# Patient Record
Sex: FEMALE | Race: BLACK OR AFRICAN AMERICAN | NOT HISPANIC OR LATINO | URBAN - METROPOLITAN AREA
[De-identification: names, ages, dates, MRNs, and addresses within clinical notes are randomized per-mention and may not be internally consistent; named-entity substitution may affect disease eponyms.]

---

## 2017-03-06 ENCOUNTER — TELEPHONE (OUTPATIENT)
Dept: OBSTETRICS AND GYNECOLOGY | Facility: CLINIC | Age: 32
End: 2017-03-06

## 2017-03-06 NOTE — TELEPHONE ENCOUNTER
----- Message from Sabi Crouch sent at 3/6/2017  8:34 AM EST -----  Regarding: pelvic pressure  Contact: [phone]  Dr rajput pt with a Mirena has had a pressure in the vagina and rectum since yesterday, she had BM on Saturday but nothing since.  With  Urination she is able to go but having some pelvic pain when she does go.      She has ask when she is due to have Mirena removed but told her I would have to have someone retreive her paper chart for old office.  She thinks it is due to be removed/changed this year.

## 2017-03-06 NOTE — TELEPHONE ENCOUNTER
It is unlikely that her pain is from an IUD that's been over 4 years, anything is possible.  If she is constipated out suggest MiraLAX daily and if there is any dysuria she should come in and get urinalysis for starters

## 2017-12-15 ENCOUNTER — OFFICE VISIT (OUTPATIENT)
Dept: OBSTETRICS AND GYNECOLOGY | Facility: CLINIC | Age: 32
End: 2017-12-15

## 2017-12-15 VITALS
BODY MASS INDEX: 39.65 KG/M2 | WEIGHT: 238 LBS | HEIGHT: 65 IN | DIASTOLIC BLOOD PRESSURE: 80 MMHG | SYSTOLIC BLOOD PRESSURE: 130 MMHG

## 2017-12-15 DIAGNOSIS — Z01.419 WOMEN'S ANNUAL ROUTINE GYNECOLOGICAL EXAMINATION: Primary | ICD-10-CM

## 2017-12-15 DIAGNOSIS — Z30.433 ENCOUNTER FOR REMOVAL AND REINSERTION OF INTRAUTERINE CONTRACEPTIVE DEVICE (IUD): ICD-10-CM

## 2017-12-15 PROCEDURE — 58301 REMOVE INTRAUTERINE DEVICE: CPT | Performed by: OBSTETRICS & GYNECOLOGY

## 2017-12-15 PROCEDURE — 58300 INSERT INTRAUTERINE DEVICE: CPT | Performed by: OBSTETRICS & GYNECOLOGY

## 2017-12-15 PROCEDURE — 99385 PREV VISIT NEW AGE 18-39: CPT | Performed by: OBSTETRICS & GYNECOLOGY

## 2017-12-15 RX ORDER — VALACYCLOVIR HYDROCHLORIDE 500 MG/1
TABLET, FILM COATED ORAL
COMMUNITY
Start: 2017-12-07 | End: 2022-07-26

## 2017-12-18 PROBLEM — Z97.5 IUD (INTRAUTERINE DEVICE) IN PLACE: Status: ACTIVE | Noted: 2017-12-18

## 2017-12-18 NOTE — PROGRESS NOTES
PROCEDURE NOTES    I identified the patient and obtained her informed consent for the procedure.    PROCEDURE  REMOVAL OF IUD    We discussed options for contraception and Kay Smith choose to have her IUD removed.    Speculum was placed and cervix identified.  The IUD string was located.  With a Ring Forcep the Mirena  IUD  was removed, she tolerated it well.   The procedure was completed without any complications.    PROCEDURE    IUD INSERTION    This patient was counseled on the benefits and risks of insertion.  She has chosen  the Mirena.    No LMP recorded. Patient has had an implant.   UCG  not done    After re-gloving with sterile gloves, the uterus sounded to 8 cm   The The Mirena was advanced to a point 2 cms from the fundus and then the arms were released from the sheath.  The device was advanced to the fundus and the device was released fully from the sheath.  .  The string was trimmed to 3-3.5 cm.    The procedure was well tolerated. Kay Smith was given 's pamphlet and instructions on pelvic rest for 72 hours; OTC tylenol/ NSAIDS and heating pad prn.   She will call for fever, pain , abnormal discharge or heavy bleeding.   Follow up in 6 weeks or sooner as needed.    Tate Conley MD

## 2018-02-23 ENCOUNTER — OFFICE VISIT (OUTPATIENT)
Dept: OBSTETRICS AND GYNECOLOGY | Facility: CLINIC | Age: 33
End: 2018-02-23

## 2018-02-23 VITALS
SYSTOLIC BLOOD PRESSURE: 140 MMHG | DIASTOLIC BLOOD PRESSURE: 90 MMHG | BODY MASS INDEX: 39.69 KG/M2 | RESPIRATION RATE: 16 BRPM | WEIGHT: 240 LBS

## 2018-02-23 DIAGNOSIS — Z30.431 IUD CHECK UP: Primary | ICD-10-CM

## 2018-02-23 PROCEDURE — 99212 OFFICE O/P EST SF 10 MIN: CPT | Performed by: OBSTETRICS & GYNECOLOGY

## 2018-03-29 ENCOUNTER — TELEPHONE (OUTPATIENT)
Dept: OBSTETRICS AND GYNECOLOGY | Facility: CLINIC | Age: 33
End: 2018-03-29

## 2018-03-29 RX ORDER — FLUCONAZOLE 150 MG/1
150 TABLET ORAL DAILY
Qty: 1 TABLET | Refills: 0 | Status: SHIPPED | OUTPATIENT
Start: 2018-03-29 | End: 2020-02-18

## 2018-03-29 NOTE — TELEPHONE ENCOUNTER
Yael pt-called stating she is on an antibiotic for a sinus infection and now has a yeast infection and requesting a RX to be sent to [facility] in [place]. Please advise pt

## 2018-09-17 ENCOUNTER — TELEPHONE (OUTPATIENT)
Dept: OBSTETRICS AND GYNECOLOGY | Facility: CLINIC | Age: 33
End: 2018-09-17

## 2018-09-17 RX ORDER — FLUCONAZOLE 150 MG/1
150 TABLET ORAL DAILY
Qty: 1 TABLET | Refills: 0 | Status: SHIPPED | OUTPATIENT
Start: 2018-09-17 | End: 2020-02-18

## 2018-09-17 NOTE — TELEPHONE ENCOUNTER
Dr. Conley Pt    Pt is having Sx of white vaginal discharge, slight itching , no odor. She thinks it is a yeast infection.  She would like a Rx sent to [facility].    Callback [phone]    [place], [place]

## 2020-02-18 ENCOUNTER — OFFICE VISIT (OUTPATIENT)
Dept: OBSTETRICS AND GYNECOLOGY | Facility: CLINIC | Age: 35
End: 2020-02-18

## 2020-02-18 VITALS
HEIGHT: 66 IN | DIASTOLIC BLOOD PRESSURE: 70 MMHG | SYSTOLIC BLOOD PRESSURE: 118 MMHG | BODY MASS INDEX: 33.75 KG/M2 | WEIGHT: 210 LBS

## 2020-02-18 DIAGNOSIS — Z97.5 IUD (INTRAUTERINE DEVICE) IN PLACE: ICD-10-CM

## 2020-02-18 DIAGNOSIS — Z01.419 ENCOUNTER FOR WELL WOMAN EXAM WITH ROUTINE GYNECOLOGICAL EXAM: Primary | ICD-10-CM

## 2020-02-18 PROCEDURE — 99395 PREV VISIT EST AGE 18-39: CPT | Performed by: OBSTETRICS & GYNECOLOGY

## 2020-02-18 NOTE — PROGRESS NOTES
Subjective   Chief Complaint   Patient presents with   • Annual Exam     Kay Smith is a 35 y.o. year old  presenting to be seen for her annual exam.    Current birth control method: IUD - Mirena.    Patient's last menstrual period was 2020.    She is sexually active.   Condoms are not typically used.    [place] is painful or she is having problems :no  She has concerns about domestic violence: no.    Cycle Frequency: Irregular not sure if every month   Menstrual cycle character: flow is typically normal   Cycle Duration: 7 - 10   Number of heavy days of flows: 1   Intermenstrual bleeding present: no   Post-coital bleeding present: no     She exercises regularly: no. Lost weight 30#  Past 2 yrs  -  Rx to start ; was down to 195 # on phenteramine  Self breast awareness:no    Calcium intake: is not adequate.1  Caffeine intake: no or mild caffeine use  Social History    Tobacco Use      Smoking status: Never Smoker      Smokeless tobacco: Never Used    Social History     Substance and Sexual Activity   Alcohol Use No        The following portions of the patient's history were reviewed and updated as is  appropriate:problem list, current medications, allergies, past family history, past medical history, past social history and past surgical history.  Family history was updated as was obstetrical history    Current Outpatient Medications:   •  levonorgestrel (MIRENA, 52 MG,) 20 MCG/24HR IUD, 1 each by Intrauterine route 1 (One) Time., Disp: , Rfl:   •  valACYclovir (VALTREX) 500 MG tablet, , Disp: , Rfl:     Review of systems  Constitutional    POS weight loss                            NEG anorexia, fevers, malaise or night sweats  Breast                POS nothing reported                            NEG persistent breast lump, skin dimpling or nipple discharge  GI                      POS nothing reported                            NEG bloating, change in bowel habits, melena or reflux symptoms       "                 POS nothing reported                            NEG dysuria, frequency or hematuria       Objective   /70   Ht 167 cm (65.75\")   Wt 95.3 kg (210 lb)   LMP 01/26/2020 Comment: mirena  Breastfeeding No   BMI 34.15 kg/m²       General:  well developed; well nourished  no acute distress  appears stated age   Skin:  No suspicious lesions seen   Thyroid:    Breasts:  Examined in supine position  Symmetric without masses or skin dimpling  Nipples normal without inversion, lesions or discharge  Fibrocystic changes are present both breasts without a discrete mass   Abdomen: soft, non-tender; no masses  no umbilical or inguinal hernias are present  no hepato-splenomegaly   Pelvis: Clinical staff was present for exam  External genitalia:  normal appearance of the external genitalia including Bartholin's and [place]'s glands.  :  urethral meatus normal;  Vaginal:  normal pink mucosa without prolapse or lesions.  Cervix:  normal appearance. friable; Pap obtained IUD string present - 2.5 cms in length;  Uterus:  normal size, shape and consistency.  Adnexa:  normal bimanual exam of the adnexa.  Rectal:  digital rectal exam not performed; anus visually normal appearing.       Lab Review   Pap test    Imaging  No data reviewed       Assessment     1. Normal GYN examination with IUD in place.  Slightly irregular cycles probably consistent with anovulation I will get her to chart her cycles.  2. IUD appears to be in proper position.  Nontender on examination no pain with intercourse some spotting from cervix  3. Weight loss associated with phentermine slight bump up off of phentermine but she reports it is been stable for couple of months.             Plan     1. Annual examination or sooner as needed; follow-up Pap cotesting   2. 1000 mg calcium in divided doses ideally in diet; regular exercise  3. Self breast awareness if > 30 years of age  4.    Chart cycles            No orders of the defined types " were placed in this encounter.    No orders of the defined types were placed in this encounter.          This note was electronically signed.    Tate Conley MD  2/18/2020

## 2021-06-08 ENCOUNTER — TRANSCRIBE ORDERS (OUTPATIENT)
Dept: DIABETES SERVICES | Facility: HOSPITAL | Age: 36
End: 2021-06-08

## 2021-06-08 DIAGNOSIS — E11.9 TYPE 2 DIABETES MELLITUS WITHOUT COMPLICATION, UNSPECIFIED WHETHER LONG TERM INSULIN USE (HCC): Primary | ICD-10-CM

## 2021-06-15 ENCOUNTER — DOCUMENTATION (OUTPATIENT)
Dept: DIABETES SERVICES | Facility: HOSPITAL | Age: 36
End: 2021-06-15

## 2021-06-16 ENCOUNTER — APPOINTMENT (OUTPATIENT)
Dept: DIABETES SERVICES | Facility: HOSPITAL | Age: 36
End: 2021-06-16

## 2021-06-16 ENCOUNTER — HOSPITAL ENCOUNTER (OUTPATIENT)
Dept: DIABETES SERVICES | Facility: HOSPITAL | Age: 36
Setting detail: RECURRING SERIES
Discharge: HOME | End: 2021-06-16

## 2021-06-16 NOTE — CONSULTS
DIABETES EDUCATION CONSULT, 120 minutes Part 2 of diabetes education. This medical referred consult was provided as a telephone call, tele-health or e-visit, as patient is unable to attend an in-office appointment due to the COVID-19 crisis. Consent for treatment was given verbally. Please see media tab for assessment and notes if you use EPIC. If you are not an EPIC user a copy of patient's assessment and notes will be sent per routine. Thank you.

## 2021-06-16 NOTE — CONSULTS
Patient was able to complete Part 1 of the comprehensive diabetes education class today. She attempted yesterday evening, but was unable to complete due to work schedule and audio difficulty. Questions were addressed and education complete. She is looking forward to completing part 2 tonight. Thank you for this referral.

## 2021-06-16 NOTE — PLAN OF CARE
Patient was scheduled for comprehensive diabetes education on 6/15/21 and 6/16/21. On 6/15 patient completed assessment prior to class by phone with educator. Patient provided RN contact information for any questions, concerns or zoom difficulties. Staff was unable to reach patient for registration. Patient logged into zoom class at 530pm. Patient was unable to fully connect to zoom class and was disconnected a few minutes later. Patient will be contacted by OP staff to be rescheduled for part 1. Patient was sent zoom link for part 2.

## 2021-07-07 ENCOUNTER — APPOINTMENT (OUTPATIENT)
Dept: DIABETES SERVICES | Facility: HOSPITAL | Age: 36
End: 2021-07-07

## 2021-07-12 ENCOUNTER — OFFICE VISIT (OUTPATIENT)
Dept: OBSTETRICS AND GYNECOLOGY | Facility: CLINIC | Age: 36
End: 2021-07-12

## 2021-07-12 VITALS
DIASTOLIC BLOOD PRESSURE: 80 MMHG | WEIGHT: 236 LBS | HEIGHT: 66 IN | BODY MASS INDEX: 37.93 KG/M2 | SYSTOLIC BLOOD PRESSURE: 118 MMHG

## 2021-07-12 DIAGNOSIS — Z01.411 ENCOUNTER FOR GYNECOLOGICAL EXAMINATION WITH ABNORMAL FINDING: Primary | ICD-10-CM

## 2021-07-12 DIAGNOSIS — N76.0 ACUTE VAGINITIS: ICD-10-CM

## 2021-07-12 DIAGNOSIS — N91.2 AMENORRHEA: ICD-10-CM

## 2021-07-12 PROCEDURE — 87210 SMEAR WET MOUNT SALINE/INK: CPT | Performed by: OBSTETRICS & GYNECOLOGY

## 2021-07-12 PROCEDURE — 99395 PREV VISIT EST AGE 18-39: CPT | Performed by: OBSTETRICS & GYNECOLOGY

## 2021-07-12 PROCEDURE — 83986 ASSAY PH BODY FLUID NOS: CPT | Performed by: OBSTETRICS & GYNECOLOGY

## 2021-07-12 RX ORDER — ONDANSETRON 4 MG/1
TABLET, FILM COATED ORAL
COMMUNITY
Start: 2021-06-26 | End: 2022-07-26

## 2021-07-12 RX ORDER — FLUTICASONE PROPIONATE 50 MCG
2 SPRAY, SUSPENSION (ML) NASAL DAILY
COMMUNITY
Start: 2021-06-28 | End: 2022-07-26

## 2021-07-12 RX ORDER — FLUCONAZOLE 200 MG/1
TABLET ORAL
COMMUNITY
Start: 2021-06-25 | End: 2022-07-26

## 2021-07-12 RX ORDER — MECLIZINE HYDROCHLORIDE 25 MG/1
25 TABLET ORAL 3 TIMES DAILY
COMMUNITY
Start: 2021-06-26 | End: 2022-07-26

## 2021-07-12 NOTE — PROGRESS NOTES
Subjective   Chief Complaint   Patient presents with   • Annual Exam   • Vaginal Discharge     on diflucan   • Vaginal Itching     Kay Smith is a 36 y.o. year old  presenting to be seen for her annual exam.    Current birth control method: IUD - Mirena.  Placed  good till   She took a antibiotic for an upper respiratory infection recently.  Has some sort of reaction since she temporarily went blinded went to the ER in [place] who put her back on the antibiotic.  Current she is taken Diflucan complaining of vaginal itching and discharge.  Not been sexually active in a while.    No LMP recorded. Patient has had an implant.    She is not sexually active in 2 years.  Condoms are not typically used.    STDs and sexual behavior discussed.  [place] is painful or she is having problems :not asked  She has concerns about domestic violence: no.    Cycle Frequency: absent                         She exercises regularly: no.  Discussed maintaining healthy weight and nutrition and injury avoidance  Self breast awareness:no    Calcium intake: is not adequate.1  Caffeine intake: no or mild caffeine use  Social History    Tobacco Use      Smoking status: Never Smoker      Smokeless tobacco: Never Used    Social History     Substance and Sexual Activity   Alcohol Use No      Discussed avoidance of illicit drugs    The following portions of the patient's history were reviewed and updated as is  appropriate:problem list, current medications, allergies, past family history, past medical history, past social history and past surgical history.    Current Outpatient Medications:   •  fluconazole (DIFLUCAN) 200 MG tablet, TAKE 1 TABLET BY MOUTH ONCE DAILY FOR 3 DAYS, Disp: , Rfl:   •  fluticasone (FLONASE) 50 MCG/ACT nasal spray, 2 sprays Daily., Disp: , Rfl:   •  levonorgestrel (MIRENA, 52 MG,) 20 MCG/24HR IUD, 1 each by Intrauterine route 1 (One) Time., Disp: , Rfl:   •  meclizine (ANTIVERT) 25 MG tablet, Take 25 mg by  "mouth 3 (Three) Times a Day. for 7 days, Disp: , Rfl:   •  ondansetron (ZOFRAN) 4 MG tablet, TAKE 1 TABLET BY MOUTH EVERY 8 HOURS FOR 3 DAYS, Disp: , Rfl:   •  valACYclovir (VALTREX) 500 MG tablet, , Disp: , Rfl:     Discussed risk factors for hypertension, hyperlipidemia coronary heart disease.    Review of systems    Constitutional    POS weight gain                            NEG anorexia, fatigue, fevers or night sweats  Breast                POS nothing reported                            NEG persistent breast lump, skin dimpling, breast tenderness or nipple discharge  GI                      POS nothing reported                            NEG bloating, change in bowel habits, melena or reflux symptoms                       POS nothing reported                            NEG dysuria, frequency or hematuria         Objective   /80   Ht 167.6 cm (66\")   Wt 107 kg (236 lb)   Breastfeeding No   BMI 38.09 kg/m²       General:  well developed; well nourished  no acute distress  appears stated age   Skin:  No suspicious lesions seen   Thyroid:    Breasts:  Examined in supine position  Symmetric without masses or skin dimpling  Nipples normal without inversion, lesions or discharge  Fibrocystic changes are present both breasts without a discrete mass   Abdomen: soft, non-tender; no masses  no umbilical or inguinal hernias are present  no hepato-splenomegaly   Pelvis: Clinical staff was present for exam  External genitalia:  normal appearance of the external genitalia including Bartholin's and [place]'s glands.  :  urethral meatus normal;  Vaginal:  normal pink mucosa without prolapse or lesions. discharge present -  yellow, white and thick; pH = 4.5 wet prep done: normal epithelial cells are present, clue cells are absent, pseudo-hyphae are absent and trichomonads are absent;  Cervix:  normal appearance. IUD string present - 2.5 cms in length;  Uterus:  normal size, shape and consistency.  Adnexa:  non " palpable bilaterally. Due to guarding  Rectal:  digital rectal exam not performed; anus visually normal appearing.       Lab Review   Pap test    Imaging  No data reviewed               Assessment     1. Normal GYN examination with amenorrhea associated with IUD  2. I think she had a yeast infection is clearing on the Diflucan.  I see no evidence of yeast microscopically.  3. Weight gain associated with Covid  4. Pap cotesting up-to-date             Plan     1. Annual examination or sooner as needed  2. 1000 mg calcium in divided doses ideally in diet; regular exercise  3. Self breast awareness discussed  4.                No orders of the defined types were placed in this encounter.    No orders of the defined types were placed in this encounter.          This note was electronically signed.    Tate Conley MD  7/12/2021

## 2021-10-27 ENCOUNTER — HOSPITAL ENCOUNTER (EMERGENCY)
Dept: HOSPITAL 22 - UTC | Age: 36
Discharge: HOME | End: 2021-10-27
Payer: MEDICAID

## 2021-10-27 VITALS
DIASTOLIC BLOOD PRESSURE: 99 MMHG | TEMPERATURE: 98.4 F | RESPIRATION RATE: 18 BRPM | OXYGEN SATURATION: 99 % | SYSTOLIC BLOOD PRESSURE: 149 MMHG | HEART RATE: 92 BPM

## 2021-10-27 VITALS
OXYGEN SATURATION: 99 % | TEMPERATURE: 98.42 F | HEART RATE: 92 BPM | RESPIRATION RATE: 18 BRPM | DIASTOLIC BLOOD PRESSURE: 99 MMHG | SYSTOLIC BLOOD PRESSURE: 149 MMHG

## 2021-10-27 VITALS — BODY MASS INDEX: 37.9 KG/M2

## 2021-10-27 DIAGNOSIS — J32.9: ICD-10-CM

## 2021-10-27 DIAGNOSIS — H66.001: Primary | ICD-10-CM

## 2021-10-27 PROCEDURE — G0463 HOSPITAL OUTPT CLINIC VISIT: HCPCS

## 2021-10-27 PROCEDURE — 99202 OFFICE O/P NEW SF 15 MIN: CPT

## 2022-07-26 ENCOUNTER — OFFICE VISIT (OUTPATIENT)
Dept: OBSTETRICS AND GYNECOLOGY | Facility: CLINIC | Age: 37
End: 2022-07-26

## 2022-07-26 VITALS
BODY MASS INDEX: 37.12 KG/M2 | RESPIRATION RATE: 16 BRPM | DIASTOLIC BLOOD PRESSURE: 70 MMHG | WEIGHT: 230 LBS | SYSTOLIC BLOOD PRESSURE: 116 MMHG

## 2022-07-26 DIAGNOSIS — Z30.431 IUD CHECK UP: ICD-10-CM

## 2022-07-26 DIAGNOSIS — Z01.419 ENCOUNTER FOR GYNECOLOGICAL EXAMINATION WITHOUT ABNORMAL FINDING: Primary | ICD-10-CM

## 2022-07-26 DIAGNOSIS — N60.11 FIBROCYSTIC BREAST CHANGES, BILATERAL: ICD-10-CM

## 2022-07-26 DIAGNOSIS — N60.12 FIBROCYSTIC BREAST CHANGES, BILATERAL: ICD-10-CM

## 2022-07-26 PROCEDURE — 99395 PREV VISIT EST AGE 18-39: CPT | Performed by: NURSE PRACTITIONER

## 2022-07-26 PROCEDURE — 2014F MENTAL STATUS ASSESS: CPT | Performed by: NURSE PRACTITIONER

## 2022-07-26 PROCEDURE — 3008F BODY MASS INDEX DOCD: CPT | Performed by: NURSE PRACTITIONER

## 2022-07-26 NOTE — PROGRESS NOTES
Annual Visit     Patient Name: Kay Smith  : 1985   MRN: [id number]   Care Team: Patient Care Team:  Bakari Osman MD as PCP - General (Internal Medicine)  Lashay Kay PA as Physician Assistant (Family Medicine)  Kemi Pino APRN as Nurse Practitioner (Nurse Practitioner)    Chief Complaint:    Chief Complaint   Patient presents with   • Annual Exam       HPI: Kay Smith is a 37 y.o. year old  presenting to be seen for her gynecologic exam.   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      Objective     Physical Exam    Neuro: alert and oriented to person, place and time   General:  alert; cooperative; well developed; well nourished   Skin:  No suspicious lesions seen   Thyroid: normal to inspection and palpation   Lungs:  breathing is unlabored  clear to auscultation bilaterally   Heart:  regular rate and rhythm, S1, S2 normal, no murmur, click, rub or gallop  normal apical impulse   Breasts:  Examined in supine position  Symmetric without masses or skin dimpling  Nipples normal without inversion, lesions or discharge  There are no palpable axillary nodes  Fibrocystic changes are present both breasts without a discrete mass   Abdomen: soft, non-tender; no masses  no umbilical or inguinal hernias are present  no hepato-splenomegaly   Pelvis: Clinical staff was present for exam  External genitalia:  normal appearance of the external genitalia including Bartholin's and [place]'s glands.  :  urethral meatus normal;  Vaginal:  normal pink mucosa without prolapse or lesions.  Cervix:  normal appearance. IUD string present - 2 cms in  length;  Uterus:  not palpable.  Adnexa:  non palpable bilaterally.  Rectal:  digital rectal exam not performed; anus visually normal appearing.     Bimanual exam limited d/t body habitus     Assessment / Plan      Assessment  Problems Addressed This Visit    ICD-10-CM ICD-9-CM   1. Encounter for gynecological examination without abnormal finding  Z01.419 V72.31   2. IUD check up  Z30.431 V25.42   3. Fibrocystic breast changes, bilateral  N60.11 610.1    N60.12        Plan    Pap smear not indicated   Discussed monthly SBEs and fibrocystic breast changes   Doing well with Mirena - discussed now indicated for 7 yrs - will be due for removal 12/2024   Reviewed warning signs to call for   AV 1 yr         19 to 39: Counseling/Anticipatory Guidance Discussed: family planning/contraception and breast cancer and self breast exams    Follow Up  Return in about 1 year (around 7/26/2023) for Annual physical.  Patient was given instructions and counseling regarding her condition or for health maintenance advice. Please see specific information pulled into the AVS if appropriate.     Kemi Pino, CELESTE  July 26, 2022  14:28 EDT

## 2023-04-04 ENCOUNTER — TELEPHONE (OUTPATIENT)
Dept: OBSTETRICS AND GYNECOLOGY | Facility: CLINIC | Age: 38
End: 2023-04-04
Payer: COMMERCIAL

## 2023-04-04 NOTE — TELEPHONE ENCOUNTER
PT STATES SHE IS HAVING SOME VAGINAL IRRITATION WITH DRYNESS. PT DENIES ANY BURNING OR ABNORMAL DISCHARGE WITH SLIGHT ITCHING, PT BELIEVES SHE MAY HAVE A POSSIBLE INFECTION AND IS WANTING TO BE SEEN, NEXT APPT IS 5/15/23 PLEASE ADVISE PT IF SHE CAN BE SEEN SOONER.

## 2023-04-05 ENCOUNTER — TELEPHONE (OUTPATIENT)
Dept: OBSTETRICS AND GYNECOLOGY | Facility: CLINIC | Age: 38
End: 2023-04-05

## 2023-04-05 NOTE — TELEPHONE ENCOUNTER
PROVIDER: EJ VELEZ    CALLER: DARSHANA CONTRERAS    PH#[phone]    SAME DAY CANCEL. SAYS MIXUP OF COMM ON DAYS SHE NEEDED

## 2023-04-11 ENCOUNTER — OFFICE VISIT (OUTPATIENT)
Dept: OBSTETRICS AND GYNECOLOGY | Facility: CLINIC | Age: 38
End: 2023-04-11
Payer: COMMERCIAL

## 2023-04-11 VITALS
BODY MASS INDEX: 34.7 KG/M2 | RESPIRATION RATE: 16 BRPM | WEIGHT: 215 LBS | SYSTOLIC BLOOD PRESSURE: 128 MMHG | DIASTOLIC BLOOD PRESSURE: 80 MMHG

## 2023-04-11 DIAGNOSIS — N76.0 VULVOVAGINITIS: Primary | ICD-10-CM

## 2023-04-11 DIAGNOSIS — Z30.431 IUD CHECK UP: ICD-10-CM

## 2023-04-11 NOTE — PROGRESS NOTES
Problem Visit     Patient Name: Kay Smith  : 1985   MRN: [id number]   Care Team: Patient Care Team:  Bakari Osman MD as PCP - General (Internal Medicine)  Lashay Kay PA as Physician Assistant (Family Medicine)  Kemi Pino APRN as Nurse Practitioner (Nurse Practitioner)    Chief Complaint:    Vulvar irritation     HPI: Kay Smith is a 38 y.o. year old  presenting to be seen with c/o vulvar irritation.   Irritation present x 1 month now   States when it first began she used a Honey Pot suppository that contains boric acid and that was helpful x 3-4 days, but then sx returned   No vaginal c/o   States vulvar irritation was severe last wk and she noticed fissures between the labial folds   Today irritation is mild - she has been using coconut oil when labia feels dry and that is helpful   Hasn't used any new products recently   Has been walking outside lately   Not sexually active in 3 yrs now   No pelvic pain     Mirena placed 2017   Amenorrhea with method   Spotting noted today - states she hasn't had any bldg with method typically     AV done 2022       Subjective      I have reviewed the patients family history, social history, past medical history, past surgical history and have updated it as appropriate.    /80   Resp 16   Wt 97.5 kg (215 lb)   BMI 34.70 kg/m²     BMI reviewed: Body mass index is 34.7 kg/m².      Objective     Physical Exam      Neuro: alert and oriented to person, place and time   General:  alert; cooperative; well developed; well nourished   Skin:  Not performed.   Thyroid: not examined   Lungs:  breathing is unlabored   Heart:  Not performed.   Breasts:  Not performed.   Abdomen: Not performed.   Pelvis: Clinical staff was present for exam  External genitalia:  normal appearance of the external genitalia including Bartholin's and [place]'s glands.  :  urethral meatus normal;  Vaginal:  normal pink mucosa without prolapse or lesions. blood  present -  small amount;  Cervix:  normal appearance. IUD string present - 2.5 cms in length;  Uterus:  normal size, shape and consistency.  Adnexa:  normal bimanual exam of the adnexa.  Rectal:  digital rectal exam not performed; anus visually normal appearing.         Assessment / Plan      Assessment  Problems Addressed This Visit    ICD-10-CM ICD-9-CM   1. Vulvovaginitis  N76.0 616.10   2. IUD check up  Z30.431 V25.42       Plan    Oneswab pending for BV, candida, and aerobic panels   Discussed d/t bldg on exam today, will need to wait for cx results - wet mount with numerous RBCs   If irritation returns and is very bothersome before cx returns, she will let me know   Discussed Replens as needed for vulvar/vaginal dryness   Reviewed expected bldg pattern with Mirena - hypomenorrhea is also expected   If pelvic pain or AUB develops, would require further evaluation - pt v/u   Will call with cx results and final POC             Follow Up  Return for Next scheduled follow up.  Patient was given instructions and counseling regarding her condition or for health maintenance advice. Please see specific information pulled into the AVS if appropriate.     Kemi Pino, APRN  April 11, 2023  16:09 EDT

## 2023-06-08 ENCOUNTER — TELEPHONE (OUTPATIENT)
Dept: OBSTETRICS AND GYNECOLOGY | Facility: CLINIC | Age: 38
End: 2023-06-08
Payer: COMMERCIAL

## 2023-06-08 NOTE — TELEPHONE ENCOUNTER
Patient was told Mirena was placed 12/15/2017 and with the new indications for birth control it is good for 8 years. 12/14/2025

## 2023-08-18 ENCOUNTER — OFFICE VISIT (OUTPATIENT)
Dept: OBSTETRICS AND GYNECOLOGY | Facility: CLINIC | Age: 38
End: 2023-08-18
Payer: COMMERCIAL

## 2023-08-18 VITALS
DIASTOLIC BLOOD PRESSURE: 84 MMHG | SYSTOLIC BLOOD PRESSURE: 120 MMHG | BODY MASS INDEX: 34.55 KG/M2 | WEIGHT: 215 LBS | HEIGHT: 66 IN

## 2023-08-18 DIAGNOSIS — Z30.49 ENCOUNTER FOR SURVEILLANCE OF OTHER CONTRACEPTIVE: ICD-10-CM

## 2023-08-18 DIAGNOSIS — Z01.419 ENCOUNTER FOR GYNECOLOGICAL EXAMINATION WITHOUT ABNORMAL FINDING: Primary | ICD-10-CM

## 2023-08-18 RX ORDER — LANCETS 33 GAUGE
EACH MISCELLANEOUS
COMMUNITY
Start: 2023-07-12

## 2023-08-18 RX ORDER — BLOOD-GLUCOSE METER
EACH MISCELLANEOUS SEE ADMIN INSTRUCTIONS
COMMUNITY
Start: 2023-06-27

## 2023-08-18 RX ORDER — HYDROXYZINE HYDROCHLORIDE 25 MG/1
1 TABLET, FILM COATED ORAL EVERY 12 HOURS SCHEDULED
COMMUNITY
Start: 2023-05-11

## 2023-08-18 RX ORDER — BLOOD SUGAR DIAGNOSTIC
STRIP MISCELLANEOUS
COMMUNITY
Start: 2023-06-27

## 2023-08-18 RX ORDER — CHOLECALCIFEROL (VITAMIN D3) 1250 MCG
1 CAPSULE ORAL WEEKLY
COMMUNITY
Start: 2023-07-29

## 2023-08-18 RX ORDER — SEMAGLUTIDE 1.34 MG/ML
1 INJECTION, SOLUTION SUBCUTANEOUS WEEKLY
COMMUNITY
Start: 2023-08-15

## 2023-08-21 LAB — REF LAB TEST METHOD: NORMAL

## 2023-08-23 NOTE — PROGRESS NOTES
"Chief Complaint  Kay Smith is a 38 y.o.  female presenting for Annual Exam    History of Present Illness  Kay is a very pleasant 39yo woman, , here for annual gyn exam.  She has had no gyn surgeries.  She uses a Mirena IUD for contraception.  She enjoys amenorrhea from that method.  She has no gyn c/o's today.    Pap smear will be updated today.  She does have a hx of abn pap.    The following portions of the patient's history were reviewed and updated as appropriate: allergies, current medications, past family history, past medical history, past social history, past surgical history, and problem list.    No Known Allergies      Current Outpatient Medications:     Blood Glucose Monitoring Suppl (ONE TOUCH ULTRA 2) w/Device kit, See Admin Instructions., Disp: , Rfl:     Cholecalciferol (Vitamin D3) 1.25 MG (35982 UT) capsule, Take 1 capsule by mouth 1 (One) Time Per Week., Disp: , Rfl:     hydrOXYzine (ATARAX) 25 MG tablet, Take 1 tablet by mouth Every 12 (Twelve) Hours., Disp: , Rfl:     Lancets (OneTouch Delica Plus [id number]) misc, USE TO CHECK GLUCOSE ONCE DAILY, Disp: , Rfl:     OneTouch Ultra test strip, USE STRIP TO CHECK GLUCOSE ONCE DAILY, Disp: , Rfl:     Ozempic, 1 MG/DOSE, 4 MG/3ML solution pen-injector, Inject 1 mg under the skin into the appropriate area as directed 1 (One) Time Per Week., Disp: , Rfl:     levonorgestrel (MIRENA) 20 MCG/24HR IUD, 1 each by Intrauterine route 1 (One) Time., Disp: , Rfl:     Past Medical History:   Diagnosis Date    Borderline diabetes     Mild dysplasia of cervix     Normal Pap  and         History reviewed. No pertinent surgical history.    Objective  /84   Ht 167.6 cm (66\")   Wt 97.5 kg (215 lb)   LMP  (LMP Unknown) Comment: Mirena IUD 12/15/2017  Breastfeeding No   BMI 34.70 kg/mý     Physical Exam  Vitals and nursing note reviewed. Exam conducted with a chaperone present.   Constitutional:       General: She is not in acute " distress.     Appearance: Normal appearance. She is not ill-appearing.   HENT:      Head: Normocephalic.   Neck:      Thyroid: No thyroid mass or thyromegaly.   Cardiovascular:      Rate and Rhythm: Normal rate and regular rhythm.      Heart sounds: Normal heart sounds. No murmur heard.  Pulmonary:      Effort: Pulmonary effort is normal. No respiratory distress.      Breath sounds: Normal breath sounds.   Chest:   Breasts:     Right: No inverted nipple, mass or nipple discharge.      Left: No inverted nipple, mass or nipple discharge.   Abdominal:      Palpations: Abdomen is soft. There is no mass.      Tenderness: There is no abdominal tenderness.   Genitourinary:     General: Normal vulva.      Labia:         Right: No rash, tenderness or lesion.         Left: No rash, tenderness or lesion.       Vagina: Normal. No vaginal discharge or erythema.      Cervix: No discharge, lesion or erythema.      Uterus: Not enlarged and not tender.       Adnexa:         Right: No mass or tenderness.          Left: No mass or tenderness.        Comments: IUD strings are visible, clean, ~2.5cm long.  Normal bimanual exam.  Anus appears wnl.  No rectal exam performed.  Lymphadenopathy:      Upper Body:      Right upper body: No supraclavicular or axillary adenopathy.      Left upper body: No supraclavicular or axillary adenopathy.   Skin:     General: Skin is warm and dry.   Neurological:      Mental Status: She is alert and oriented to person, place, and time.   Psychiatric:         Mood and Affect: Mood normal.         Behavior: Behavior normal.       Assessment/Plan   Diagnoses and all orders for this visit:    1. Encounter for gynecological examination without abnormal finding (Primary)  -     LIQUID-BASED PAP SMEAR WITH HPV GENOTYPING REGARDLESS OF INTERPRETATION ([facility])    2. Encounter for surveillance of other contraceptive        Procedures    19 to 39: Counseling/Anticipatory Guidance Discussed: family  planning/contraception and breast cancer and self breast exams    Return in about 1 year (around 8/18/2024) for Annual physical.    Clara Rodarte, APRN  08/18/2023

## 2023-10-26 ENCOUNTER — TELEPHONE (OUTPATIENT)
Dept: FAMILY MEDICINE CLINIC | Facility: CLINIC | Age: 38
End: 2023-10-26
Payer: COMMERCIAL

## 2023-10-26 NOTE — TELEPHONE ENCOUNTER
THE PATIENT IS CALLING IN SHE HAS CHANGED INSURANCE AND NOW NEEDS NEW AUTHORIZATIONS FOR HER MEDICATION [facility] THE PATIENT NOW HAS INSURANCE WITH [employer] PLEASE CALL PATIENT TO LET HER KNOW IF THE AUTHORIZATION CAN BE DONE       CALL [phone]

## 2023-10-27 NOTE — TELEPHONE ENCOUNTER
Ozempic PA approved, pt aware. Key: [id number]. Pt has never been seen here but is a Paula pt. No info on file yet but confirmed w/ pt she has tried/failed Metformin (diarrhea) Added to allergy list.     Pt has Steward:   ID [id number]  Grp: [id number]  BIN: [id number]  PCN: [id number]

## 2023-11-28 RX ORDER — SEMAGLUTIDE 1.34 MG/ML
1 INJECTION, SOLUTION SUBCUTANEOUS WEEKLY
Status: CANCELLED | OUTPATIENT
Start: 2023-11-28

## 2023-11-28 NOTE — TELEPHONE ENCOUNTER
PATIENT STATED SHE WAS OUT OF REFILL AND PHARMACY HAS BEEN GETTING A HOLD OF HER BUT HAVE NOT RECEIVED ANYTHING

## 2023-12-01 ENCOUNTER — TELEPHONE (OUTPATIENT)
Dept: OBSTETRICS AND GYNECOLOGY | Facility: CLINIC | Age: 38
End: 2023-12-01

## 2023-12-01 NOTE — TELEPHONE ENCOUNTER
Hub staff attempted to follow warm transfer process and was unsuccessful     Caller: Kay Smith    Relationship to patient: Self    Best call back number: [phone] CALL ANYTIME, IT IS OKAY TO LVM.    Patient is needing: PATIENT HAS VAGINAL IRRITATION AROUND LABIA. PATIENT DECLINED DISCHARGE, ITCHING OR BURNING.    PATIENT WAS LAST SEEN 08/18/23 AND HAD IRRITATION AT THAT TIME. PATIENT WAS TOLD IRRITATION WAS FROM A LACK OF A GOOD BACTERIA. PATIENT HAS TRIED OVER THE COUNTER PROBIOTIC AND REPHRESH GEL. FIRST AVAILABLE GYN FOLLOW WAS 05/22/24. PATIENT WOULD LIKE TO BE SEEN SOONER. HUB UNABLE TO WARM TRANSFER.

## 2023-12-05 ENCOUNTER — OFFICE VISIT (OUTPATIENT)
Dept: FAMILY MEDICINE CLINIC | Facility: CLINIC | Age: 38
End: 2023-12-05
Payer: COMMERCIAL

## 2023-12-05 VITALS
HEART RATE: 98 BPM | DIASTOLIC BLOOD PRESSURE: 78 MMHG | BODY MASS INDEX: 34.42 KG/M2 | SYSTOLIC BLOOD PRESSURE: 112 MMHG | WEIGHT: 214.2 LBS | TEMPERATURE: 97.1 F | HEIGHT: 66 IN | OXYGEN SATURATION: 97 % | RESPIRATION RATE: 18 BRPM

## 2023-12-05 DIAGNOSIS — E11.65 TYPE 2 DIABETES MELLITUS WITH HYPERGLYCEMIA, WITHOUT LONG-TERM CURRENT USE OF INSULIN: Primary | ICD-10-CM

## 2023-12-05 DIAGNOSIS — F43.0 STRESS DISORDER, ACUTE: ICD-10-CM

## 2023-12-05 RX ORDER — SEMAGLUTIDE 1.34 MG/ML
1 INJECTION, SOLUTION SUBCUTANEOUS WEEKLY
Qty: 9 ML | Refills: 3 | Status: SHIPPED | OUTPATIENT
Start: 2023-12-05

## 2023-12-05 NOTE — PROGRESS NOTES
Date: 2023   Patient Name: Kay Smith  : 1985   MRN: [id number]     Chief Complaint:    Chief Complaint   Patient presents with    Establish Care     Transferring [facility].  Med Refills       History of Present Illness: Kay Smith is a 38 y.o. female who is here today to establish care.  Patient is a prior patient of mine.  She is here to reestablish care.  She is needing up-to-date blood work for management of diabetes.  She has tried and failed metformin and Rybelsus in the past.  She is currently taking Ozempic 1 mg dose once a week and is doing well on diabetes management.  She does report she has had increased stress from unknown of job security.  She has also been associated with not sleeping well.  She reports that she should know something in regards to her job security by 2023.  She is pretty anxious today in clinic.            Review of Systems:   Review of Systems   Constitutional:  Negative for activity change, appetite change, fatigue, unexpected weight gain and unexpected weight loss.   HENT:  Negative for congestion, dental problem, ear pain, hearing loss, trouble swallowing and voice change.    Eyes:  Negative for blurred vision, pain and visual disturbance.   Respiratory:  Negative for apnea, cough, chest tightness, shortness of breath and wheezing.    Cardiovascular:  Negative for chest pain, palpitations and leg swelling.   Gastrointestinal:  Negative for abdominal pain, constipation, diarrhea, nausea and GERD.   Endocrine: Negative for cold intolerance, heat intolerance, polydipsia, polyphagia and polyuria.   Genitourinary:  Negative for decreased libido, dysuria, frequency, hematuria and urinary incontinence.   Musculoskeletal:  Negative for arthralgias, back pain, gait problem, joint swelling, myalgias and neck pain.   Skin:  Negative for dry skin, rash, skin lesions, wound and bruise.   Allergic/Immunologic: Negative for environmental allergies and  food allergies.   Neurological:  Negative for dizziness, seizures, speech difficulty, weakness, headache, memory problem and confusion.   Psychiatric/Behavioral:  Negative for agitation, behavioral problems, sleep disturbance, depressed mood and stress. The patient is not nervous/anxious.        Past Medical History:   Past Medical History:   Diagnosis Date    Borderline diabetes     Depression     Mild dysplasia of cervix 2007    Normal Pap 2014 and 2017    Urinary tract infection        Past Surgical History: History reviewed. No pertinent surgical history.    Family History:   Family History   Problem Relation Age of Onset    Diabetes Paternal Aunt         Great Aunt  - adopted and raised [place]    Stroke Father 66    Osteoporosis Neg Hx        Social History:   Social History     Socioeconomic History    Marital status: Single   Tobacco Use    Smoking status: Never    Smokeless tobacco: Never   Vaping Use    Vaping Use: Never used   Substance and Sexual Activity    Alcohol use: No    Drug use: No    Sexual activity: Not Currently     Partners: Male     Birth control/protection: I.U.D.     Comment: Mirena IUD 12/2017       Medications:     Current Outpatient Medications:     Blood Glucose Monitoring Suppl (ONE TOUCH ULTRA 2) w/Device kit, See Admin Instructions., Disp: , Rfl:     hydrOXYzine (ATARAX) 25 MG tablet, Take 1 tablet by mouth Every 12 (Twelve) Hours., Disp: , Rfl:     Lancets (OneTouch Delica Plus [id number]) misc, USE TO CHECK GLUCOSE ONCE DAILY, Disp: , Rfl:     levonorgestrel (MIRENA) 20 MCG/24HR IUD, 1 each by Intrauterine route 1 (One) Time., Disp: , Rfl:     OneTouch Ultra test strip, USE STRIP TO CHECK GLUCOSE ONCE DAILY, Disp: , Rfl:     Ozempic, 1 MG/DOSE, 4 MG/3ML solution pen-injector, Inject 1 mg under the skin into the appropriate area as directed 1 (One) Time Per Week., Disp: 9 mL, Rfl: 3    Cholecalciferol (Vitamin D3) 1.25 MG (05762 UT) capsule, Take 1 capsule by mouth 1 (One) Time Per  "Week. (Patient not taking: Reported on 12/5/2023), Disp: , Rfl:     Allergies:   Allergies   Allergen Reactions    Metformin Diarrhea       PHQ-9 Total Score: 14     Physical Exam:  Vital Signs:   Vitals:    12/05/23 1118   BP: 112/78   Pulse: 98   Resp: 18   Temp: 97.1 °F (36.2 °C)   SpO2: 97%   Weight: 97.2 kg (214 lb 3.2 oz)   Height: 167.6 cm (66\")     Body mass index is 34.57 kg/m².           Physical Exam  Vitals and nursing note reviewed.   Constitutional:       General: She is awake.      Appearance: Normal appearance. She is well-developed.   HENT:      Head: Normocephalic and atraumatic.      Right Ear: Tympanic membrane, ear canal and external ear normal.      Left Ear: Tympanic membrane, ear canal and external ear normal.      Nose: Nose normal.      Mouth/Throat:      Mouth: Mucous membranes are moist.      Pharynx: Oropharynx is clear.   Eyes:      Extraocular Movements: Extraocular movements intact.      Conjunctiva/sclera: Conjunctivae normal.      Pupils: Pupils are equal, round, and reactive to light.   Cardiovascular:      Rate and Rhythm: Normal rate and regular rhythm.      Pulses: Normal pulses.      Heart sounds: Normal heart sounds.   Pulmonary:      Effort: Pulmonary effort is normal.      Breath sounds: Normal breath sounds.   Abdominal:      General: Bowel sounds are normal.      Palpations: Abdomen is soft.   Musculoskeletal:         General: Normal range of motion.      Cervical back: Normal range of motion and neck supple.      Right lower leg: No edema.      Left lower leg: No edema.   Skin:     General: Skin is warm.      Capillary Refill: Capillary refill takes less than 2 seconds.   Neurological:      General: No focal deficit present.      Mental Status: She is alert and oriented to person, place, and time.   Psychiatric:         Mood and Affect: Mood normal.         Behavior: Behavior normal. Behavior is cooperative.         Thought Content: Thought content normal.         " Judgment: Judgment normal.         Procedures     Assessment/Plan:   Diagnoses and all orders for this visit:    1. Type 2 diabetes mellitus with hyperglycemia, without long-term current use of insulin (Primary)  -     Ozempic, 1 MG/DOSE, 4 MG/3ML solution pen-injector; Inject 1 mg under the skin into the appropriate area as directed 1 (One) Time Per Week.  Dispense: 9 mL; Refill: 3  -     Comprehensive Metabolic Panel  -     CBC Auto Differential  -     Hemoglobin A1c    2. Stress disorder, acute       Diabetes Education  Goal A1C 7 or less  Check glucose at least 1x per day unless otherwise specified  Yearly eye exams  Check feet daily  Eat low carb diet, low sugar  Increase water intake  Exercise 30-45 mins, 3-4x a week  Take meds as prescribed    Anxiety and Depression  Patient and I discussed medication management but at this time we will hold off.  Encouraged to develop good coping mechanisms  Notify if symptoms are worsening   She has been on medications in the past but unable to recall which ones.  Best outcome of improving anx/dep is in conjunction with therapy/counseling; this was discussed in clinic today and suggested to the patient.        Follow Up:   Return in about 3 months (around 3/5/2024) for Recheck chronic care management.      Paula Barry. CELESTE FLORES [facility]

## 2023-12-06 LAB
ALBUMIN SERPL-MCNC: 4.2 G/DL (ref 3.9–4.9)
ALBUMIN/GLOB SERPL: 1.3 {RATIO} (ref 1.2–2.2)
ALP SERPL-CCNC: 67 IU/L (ref 44–121)
ALT SERPL-CCNC: 12 IU/L (ref 0–32)
AST SERPL-CCNC: 12 IU/L (ref 0–40)
BASOPHILS # BLD AUTO: 0 X10E3/UL (ref 0–0.2)
BASOPHILS NFR BLD AUTO: 0 %
BILIRUB SERPL-MCNC: 0.3 MG/DL (ref 0–1.2)
BUN SERPL-MCNC: 12 MG/DL (ref 6–20)
BUN/CREAT SERPL: 14 (ref 9–23)
CALCIUM SERPL-MCNC: 9.9 MG/DL (ref 8.7–10.2)
CHLORIDE SERPL-SCNC: 105 MMOL/L (ref 96–106)
CO2 SERPL-SCNC: 20 MMOL/L (ref 20–29)
CREAT SERPL-MCNC: 0.85 MG/DL (ref 0.57–1)
EGFRCR SERPLBLD CKD-EPI 2021: 91 ML/MIN/1.73
EOSINOPHIL # BLD AUTO: 0.1 X10E3/UL (ref 0–0.4)
EOSINOPHIL NFR BLD AUTO: 1 %
ERYTHROCYTE [DISTWIDTH] IN BLOOD BY AUTOMATED COUNT: 12.8 % (ref 11.7–15.4)
GLOBULIN SER CALC-MCNC: 3.3 G/DL (ref 1.5–4.5)
GLUCOSE SERPL-MCNC: 148 MG/DL (ref 70–99)
HBA1C MFR BLD: 5.6 % (ref 4.8–5.6)
HCT VFR BLD AUTO: 40.6 % (ref 34–46.6)
HGB BLD-MCNC: 13.2 G/DL (ref 11.1–15.9)
IMM GRANULOCYTES # BLD AUTO: 0 X10E3/UL (ref 0–0.1)
IMM GRANULOCYTES NFR BLD AUTO: 0 %
LYMPHOCYTES # BLD AUTO: 1.7 X10E3/UL (ref 0.7–3.1)
LYMPHOCYTES NFR BLD AUTO: 31 %
MCH RBC QN AUTO: 28.8 PG (ref 26.6–33)
MCHC RBC AUTO-ENTMCNC: 32.5 G/DL (ref 31.5–35.7)
MCV RBC AUTO: 89 FL (ref 79–97)
MONOCYTES # BLD AUTO: 0.3 X10E3/UL (ref 0.1–0.9)
MONOCYTES NFR BLD AUTO: 6 %
NEUTROPHILS # BLD AUTO: 3.4 X10E3/UL (ref 1.4–7)
NEUTROPHILS NFR BLD AUTO: 62 %
PLATELET # BLD AUTO: 318 X10E3/UL (ref 150–450)
POTASSIUM SERPL-SCNC: 4.1 MMOL/L (ref 3.5–5.2)
PROT SERPL-MCNC: 7.5 G/DL (ref 6–8.5)
RBC # BLD AUTO: 4.58 X10E6/UL (ref 3.77–5.28)
SODIUM SERPL-SCNC: 139 MMOL/L (ref 134–144)
WBC # BLD AUTO: 5.6 X10E3/UL (ref 3.4–10.8)

## 2023-12-07 ENCOUNTER — OFFICE VISIT (OUTPATIENT)
Dept: OBSTETRICS AND GYNECOLOGY | Facility: CLINIC | Age: 38
End: 2023-12-07
Payer: COMMERCIAL

## 2023-12-07 VITALS
WEIGHT: 214.29 LBS | HEIGHT: 66 IN | DIASTOLIC BLOOD PRESSURE: 78 MMHG | BODY MASS INDEX: 34.44 KG/M2 | SYSTOLIC BLOOD PRESSURE: 120 MMHG

## 2023-12-07 DIAGNOSIS — N76.3 CHRONIC VULVITIS: Primary | ICD-10-CM

## 2023-12-07 RX ORDER — CLOBETASOL PROPIONATE 0.5 MG/G
1 OINTMENT TOPICAL 2 TIMES DAILY
Qty: 45 G | Refills: 3 | Status: SHIPPED | OUTPATIENT
Start: 2023-12-07

## 2023-12-07 NOTE — PROGRESS NOTES
Chief Complaint  Kay Smith is a 38 y.o.  female presenting for Follow-up (C/O vulvar and clitoral irritation.  Not SA X 4 years.)    History of Present Illness  Kay is a pleasant 37yo woman, , here for gyn problem.  She has no history of any gynecologic surgeries.  She c/o persistent external irritation of vulva and clitoral area.    The symptoms have been bothersome x months.  Sometimes she gets fissures.  Has Tx with vaseline & coconut oil.  A OneSwab (2023) with CELESTE Roberto in this office, was positive for one strain BV, but no other pathogens.  She had an absence of Lactobacilli.  She has not been sexually active in the past 4 years.    The following portions of the patient's history were reviewed and updated as appropriate: allergies, current medications, past family history, past medical history, past social history, past surgical history, and problem list.    Allergies   Allergen Reactions    Metformin Diarrhea         Current Outpatient Medications:     Blood Glucose Monitoring Suppl (ONE TOUCH ULTRA 2) w/Device kit, See Admin Instructions., Disp: , Rfl:     Cholecalciferol (Vitamin D3) 1.25 MG (64939 UT) capsule, Take 1 capsule by mouth 1 (One) Time Per Week. (Patient not taking: Reported on 2023), Disp: , Rfl:     clobetasol (TEMOVATE) 0.05 % ointment, Apply 1 application  topically to the appropriate area as directed 2 (Two) Times a Day. Blitz:  1 application topically to affected area BID (AM & HS) x 2 wks; then 1x/day (HS) x 2 wks; then 2x/ week., Disp: 45 g, Rfl: 3    hydrOXYzine (ATARAX) 25 MG tablet, Take 1 tablet by mouth Every 12 (Twelve) Hours., Disp: , Rfl:     Lancets (OneTouch Delica Plus [id number]) misc, USE TO CHECK GLUCOSE ONCE DAILY, Disp: , Rfl:     levonorgestrel (MIRENA) 20 MCG/24HR IUD, 1 each by Intrauterine route 1 (One) Time., Disp: , Rfl:     OneTouch Ultra test strip, USE STRIP TO CHECK GLUCOSE ONCE DAILY, Disp: , Rfl:     Ozempic, 1 MG/DOSE, 4  "MG/3ML solution pen-injector, Inject 1 mg under the skin into the appropriate area as directed 1 (One) Time Per Week., Disp: 9 mL, Rfl: 3    Past Medical History:   Diagnosis Date    Borderline diabetes     Depression     Mild dysplasia of cervix 2007    Normal Pap 2014 and 2017    Urinary tract infection         History reviewed. No pertinent surgical history.    Objective  /78   Ht 167.6 cm (66\")   Wt 97.2 kg (214 lb 4.6 oz)   LMP  (LMP Unknown)   Breastfeeding No   BMI 34.59 kg/m²     Physical Exam  Vitals and nursing note reviewed. Exam conducted with a chaperone present.   Constitutional:       Appearance: Normal appearance.   Abdominal:      General: Bowel sounds are normal.      Palpations: Abdomen is soft. There is no mass.      Tenderness: There is no abdominal tenderness.   Genitourinary:     General: Normal vulva.      Labia:         Right: Tenderness present. No rash.         Left: Tenderness present. No rash.       Vagina: Normal. Bleeding present. No vaginal discharge.      Cervix: No cervical motion tenderness, discharge, lesion or erythema.      Uterus: Normal. Not enlarged and not tender.       Adnexa: Right adnexa normal and left adnexa normal.        Right: No mass or tenderness.          Left: No mass or tenderness.        Rectum: Normal.      Comments: External appearance with no fissures today, but various colors of gray on her vulva.  1+ brown menses today.  Anus appears wnl.  (No rectal exam performed.)  Skin:     General: Skin is warm and dry.   Neurological:      Mental Status: She is alert and oriented to person, place, and time.   Psychiatric:         Mood and Affect: Mood normal.         Behavior: Behavior normal.         Assessment/Plan   Diagnoses and all orders for this visit:    1. Chronic vulvitis (Primary)    Other orders  -     clobetasol (TEMOVATE) 0.05 % ointment; Apply 1 application  topically to the appropriate area as directed 2 (Two) Times a Day. Blitz:  1 " application topically to affected area BID (AM & HS) x 2 wks; then 1x/day (HS) x 2 wks; then 2x/ week.  Dispense: 45 g; Refill: 3    Couns re: melissa care / avoiding the Guadalupe use.    FU short-term.  Observe and inspect with colposcopy.  Bx if indicated.    Procedures            Return for Colpo of Vulva / maybe Bx --- 30 min visit (at least by Jan).    Clara Rodarte, APRMAGO  12/07/2023

## 2023-12-13 ENCOUNTER — PATIENT ROUNDING (BHMG ONLY) (OUTPATIENT)
Dept: FAMILY MEDICINE CLINIC | Facility: CLINIC | Age: 38
End: 2023-12-13
Payer: COMMERCIAL

## 2023-12-13 NOTE — PROGRESS NOTES
A My-Chart message has been sent to the patient for PATIENT ROUNDING with [facility].

## 2024-01-03 ENCOUNTER — TELEPHONE (OUTPATIENT)
Dept: OBSTETRICS AND GYNECOLOGY | Facility: CLINIC | Age: 39
End: 2024-01-03

## 2024-01-03 NOTE — TELEPHONE ENCOUNTER
Caller: Kay Smith    Relationship: Self    Best call back number: [phone] CALL ANYTIME, IT IS OKAY TO LVM.    What does billing need from the patient: PATIENT WOULD LIKE TO SPEAK WITH OFFICE TO ISSUE REFUND ON CO PAY FOR  COLPOSCOPY APPT ON 01/02/24.  PATIENT STATED CO PAY WAS PAID AND THEN TOLD INSURANCE IS NO LONGER EFFECTIVE AND APPT WILL NEED TO BE RESCHEDULED.

## 2024-01-10 ENCOUNTER — OFFICE VISIT (OUTPATIENT)
Dept: OBSTETRICS AND GYNECOLOGY | Facility: CLINIC | Age: 39
End: 2024-01-10
Payer: COMMERCIAL

## 2024-01-10 VITALS
SYSTOLIC BLOOD PRESSURE: 122 MMHG | HEIGHT: 66 IN | BODY MASS INDEX: 34.39 KG/M2 | DIASTOLIC BLOOD PRESSURE: 76 MMHG | WEIGHT: 214 LBS

## 2024-01-10 DIAGNOSIS — Z09 FOLLOW-UP EXAM: ICD-10-CM

## 2024-01-10 DIAGNOSIS — N90.4 LICHEN SCLEROSUS OF FEMALE GENITALIA: Primary | ICD-10-CM

## 2024-01-10 NOTE — PROGRESS NOTES
Chief Complaint  Kay Smith is a 38 y.o.  female presenting for Procedure (Vulvar colposcopy.)    History of Present Illness  Kay is a very pleasant 37yo woman, , here for re-ck of vulvar condition & colpo/bx if necessary.  She is now 4 weeks into mod potency steroid ointment on the vulva.    States she felt remarkably better in the first week.  She did the blitz as recommended (BID x 2 wks, then q HS x 2 wks).  She feels no irritation or itching or burning.  Now without fissures or any tender areas.    The following portions of the patient's history were reviewed and updated as appropriate: allergies, current medications, past family history, past medical history, past social history, past surgical history, and problem list.    Allergies   Allergen Reactions    Metformin Diarrhea         Current Outpatient Medications:     Blood Glucose Monitoring Suppl (ONE TOUCH ULTRA 2) w/Device kit, See Admin Instructions., Disp: , Rfl:     Cholecalciferol (Vitamin D3) 1.25 MG (53095 UT) capsule, Take 1 capsule by mouth 1 (One) Time Per Week. (Patient not taking: Reported on 2023), Disp: , Rfl:     clobetasol (TEMOVATE) 0.05 % ointment, Apply 1 application  topically to the appropriate area as directed 2 (Two) Times a Day. Blitz:  1 application topically to affected area BID (AM & HS) x 2 wks; then 1x/day (HS) x 2 wks; then 2x/ week., Disp: 45 g, Rfl: 3    hydrOXYzine (ATARAX) 25 MG tablet, Take 1 tablet by mouth Every 12 (Twelve) Hours., Disp: , Rfl:     Lancets (OneTouch Delica Plus [id number]) misc, USE TO CHECK GLUCOSE ONCE DAILY, Disp: , Rfl:     levonorgestrel (MIRENA) 20 MCG/24HR IUD, 1 each by Intrauterine route 1 (One) Time., Disp: , Rfl:     OneTouch Ultra test strip, USE STRIP TO CHECK GLUCOSE ONCE DAILY, Disp: , Rfl:     Ozempic, 1 MG/DOSE, 4 MG/3ML solution pen-injector, Inject 1 mg under the skin into the appropriate area as directed 1 (One) Time Per Week., Disp: 9 mL, Rfl: 3    Past Medical  "History:   Diagnosis Date    Borderline diabetes     Depression     Mild dysplasia of cervix 2007    Normal Pap 2014 and 2017    Urinary tract infection         History reviewed. No pertinent surgical history.    Objective  /76   Ht 167.6 cm (66\")   Wt 97.1 kg (214 lb)   LMP  (LMP Unknown) Comment: Mirena IUD.  Breastfeeding No   BMI 34.54 kg/m²     Physical Exam  Vitals and nursing note reviewed.   Constitutional:       Appearance: Normal appearance.   Genitourinary:     Labia:         Right: No rash, tenderness or lesion.         Left: No rash, tenderness or lesion.       Comments: Colposcopy was NOT done.  The vulva now have a normal appearance without lesions or anything suspicious.  Skin:     General: Skin is warm and dry.   Neurological:      Mental Status: She is alert and oriented to person, place, and time.   Psychiatric:         Mood and Affect: Mood normal.         Behavior: Behavior normal.         Assessment/Plan   Diagnoses and all orders for this visit:    1. Lichen sclerosus of female genitalia (Primary)    2. Follow-up exam    Do continue the Clobetasol 2x/ weekly at HS.  Call us back / RTC prn any recurrent symptoms.  Otherwise, RTC for annual.    Procedures    40 to 64: Counseling/Anticipatory Guidance Discussed: Vulvar condition / importance of reaching out to us with any recurrent symptoms.    Return for Annual physical.    Clara Rodarte, APRN  01/10/2024  "

## 2024-01-29 ENCOUNTER — OFFICE VISIT (OUTPATIENT)
Dept: FAMILY MEDICINE CLINIC | Facility: CLINIC | Age: 39
End: 2024-01-29
Payer: COMMERCIAL

## 2024-01-29 VITALS
HEART RATE: 90 BPM | WEIGHT: 210.5 LBS | DIASTOLIC BLOOD PRESSURE: 88 MMHG | TEMPERATURE: 97.8 F | BODY MASS INDEX: 33.83 KG/M2 | RESPIRATION RATE: 14 BRPM | SYSTOLIC BLOOD PRESSURE: 122 MMHG | HEIGHT: 66 IN | OXYGEN SATURATION: 97 %

## 2024-01-29 DIAGNOSIS — M46.1 SI (SACROILIAC) JOINT INFLAMMATION: Primary | ICD-10-CM

## 2024-01-29 DIAGNOSIS — M54.32 LEFT SCIATIC NERVE PAIN: ICD-10-CM

## 2024-01-29 PROCEDURE — 1159F MED LIST DOCD IN RCRD: CPT | Performed by: NURSE PRACTITIONER

## 2024-01-29 PROCEDURE — 1160F RVW MEDS BY RX/DR IN RCRD: CPT | Performed by: NURSE PRACTITIONER

## 2024-01-29 PROCEDURE — 99213 OFFICE O/P EST LOW 20 MIN: CPT | Performed by: NURSE PRACTITIONER

## 2024-01-29 PROCEDURE — 96372 THER/PROPH/DIAG INJ SC/IM: CPT | Performed by: NURSE PRACTITIONER

## 2024-01-29 RX ORDER — DICLOFENAC SODIUM 75 MG/1
75 TABLET, DELAYED RELEASE ORAL 2 TIMES DAILY
Qty: 60 TABLET | Refills: 2 | Status: SHIPPED | OUTPATIENT
Start: 2024-01-29

## 2024-01-29 RX ORDER — METHOCARBAMOL 750 MG/1
750 TABLET, FILM COATED ORAL 3 TIMES DAILY
Qty: 21 TABLET | Refills: 0 | Status: SHIPPED | OUTPATIENT
Start: 2024-01-29 | End: 2024-02-05

## 2024-01-29 RX ORDER — TRIAMCINOLONE ACETONIDE 40 MG/ML
40 INJECTION, SUSPENSION INTRA-ARTICULAR; INTRAMUSCULAR ONCE
Status: COMPLETED | OUTPATIENT
Start: 2024-01-29 | End: 2024-01-29

## 2024-01-29 RX ADMIN — TRIAMCINOLONE ACETONIDE 40 MG: 40 INJECTION, SUSPENSION INTRA-ARTICULAR; INTRAMUSCULAR at 13:01

## 2024-01-29 NOTE — PROGRESS NOTES
Date: 2024   Patient Name: Kay Smith  : 1985   MRN: [id number]     Chief Complaint:    Chief Complaint   Patient presents with    Back Pain     Lower back X 1 year radiates down both legs but mostly lt side to calf. Has seen Paula in [place] for this.        History of Present Illness: Kay Smith is a 38 y.o. female who is here today for Low back pain, BL hips and radiating down the left leg. No known trauma. 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 Taking tylenol 500mg without relief of symptoms. No urinary symptoms associated.              Review of Systems:   Review of Systems   Constitutional:  Positive for activity change. Negative for fatigue.   Respiratory:  Negative for shortness of breath.    Cardiovascular:  Negative for chest pain.   Gastrointestinal:  Negative for abdominal pain, constipation and diarrhea.   Genitourinary:  Negative for difficulty urinating and urinary incontinence.   Musculoskeletal:  Positive for back pain. Negative for gait problem.       Past Medical History:   Past Medical History:   Diagnosis Date    Borderline diabetes     Depression     Mild dysplasia of cervix     Normal Pap  and     Urinary tract infection        Past Surgical History: History reviewed. No pertinent surgical history.    Family History:   Family History   Problem Relation Age of Onset    Diabetes Paternal Aunt         Great Aunt  - adopted and raised Kay    Stroke Father 66    Osteoporosis Neg Hx        Social History:   Social History     Socioeconomic History    Marital status: Single   Tobacco Use    Smoking status: Never    Smokeless tobacco: Never   Vaping Use    Vaping Use: Never used   Substance and Sexual Activity    Alcohol use: No    Drug use: No    Sexual activity: Not Currently     Partners: Male     Birth control/protection: I.U.D.     Comment: Mirena IUD 2017       Medications:     Current Outpatient Medications:      "Blood Glucose Monitoring Suppl (ONE TOUCH ULTRA 2) w/Device kit, See Admin Instructions., Disp: , Rfl:     clobetasol (TEMOVATE) 0.05 % ointment, Apply 1 application  topically to the appropriate area as directed 2 (Two) Times a Day. Blitz:  1 application topically to affected area BID (AM & HS) x 2 wks; then 1x/day (HS) x 2 wks; then 2x/ week., Disp: 45 g, Rfl: 3    hydrOXYzine (ATARAX) 25 MG tablet, Take 1 tablet by mouth Every 12 (Twelve) Hours., Disp: , Rfl:     Lancets (OneTouch Delica Plus [id number]) misc, USE TO CHECK GLUCOSE ONCE DAILY, Disp: , Rfl:     levonorgestrel (MIRENA) 20 MCG/24HR IUD, 1 each by Intrauterine route 1 (One) Time., Disp: , Rfl:     OneTouch Ultra test strip, USE STRIP TO CHECK GLUCOSE ONCE DAILY, Disp: , Rfl:     Ozempic, 1 MG/DOSE, 4 MG/3ML solution pen-injector, Inject 1 mg under the skin into the appropriate area as directed 1 (One) Time Per Week., Disp: 9 mL, Rfl: 3    diclofenac (VOLTAREN) 75 MG EC tablet, Take 1 tablet by mouth 2 (Two) Times a Day., Disp: 60 tablet, Rfl: 2    methocarbamol (ROBAXIN) 750 MG tablet, Take 1 tablet by mouth 3 (Three) Times a Day for 7 days., Disp: 21 tablet, Rfl: 0  No current facility-administered medications for this visit.    Allergies:   Allergies   Allergen Reactions    Metformin Diarrhea         Physical Exam:  Vital Signs:   Vitals:    01/29/24 1206   BP: 122/88   Pulse: 90   Resp: 14   Temp: 97.8 °F (36.6 °C)   SpO2: 97%   Weight: 95.5 kg (210 lb 8 oz)   Height: 167.6 cm (66\")     Body mass index is 33.98 kg/m².     Physical Exam  Vitals and nursing note reviewed.   Constitutional:       Appearance: Normal appearance.   HENT:      Head: Normocephalic and atraumatic.   Cardiovascular:      Rate and Rhythm: Normal rate and regular rhythm.   Pulmonary:      Effort: Pulmonary effort is normal.      Breath sounds: Normal breath sounds.   Abdominal:      General: Bowel sounds are normal.   Musculoskeletal:      Cervical back: Normal.      Thoracic " back: Normal.      Lumbar back: Tenderness present. Decreased range of motion.   Skin:     General: Skin is warm.   Neurological:      General: No focal deficit present.      Mental Status: She is alert and oriented to person, place, and time.   Psychiatric:         Mood and Affect: Mood normal.           Assessment/Plan:   Diagnoses and all orders for this visit:    1. SI (sacroiliac) joint inflammation (Primary)  -     diclofenac (VOLTAREN) 75 MG EC tablet; Take 1 tablet by mouth 2 (Two) Times a Day.  Dispense: 60 tablet; Refill: 2  -     methocarbamol (ROBAXIN) 750 MG tablet; Take 1 tablet by mouth 3 (Three) Times a Day for 7 days.  Dispense: 21 tablet; Refill: 0    2. Left sciatic nerve pain  -     diclofenac (VOLTAREN) 75 MG EC tablet; Take 1 tablet by mouth 2 (Two) Times a Day.  Dispense: 60 tablet; Refill: 2  -     methocarbamol (ROBAXIN) 750 MG tablet; Take 1 tablet by mouth 3 (Three) Times a Day for 7 days.  Dispense: 21 tablet; Refill: 0  -     triamcinolone acetonide (KENALOG-40) injection 40 mg       Back pain education  Back exercising and stretching discussed in clinic  Monitor for worsening symptoms  Go to the ER with severe symptoms, loss of function of lower extremities, numbness or tingling present in lower extremities, loss of bowel or bladder function  Take medications as prescribed within chart.      Follow Up:   Return if symptoms worsen or fail to improve.    Paula Barry. CELESTE FLORES [facility]

## 2024-02-06 ENCOUNTER — TELEPHONE (OUTPATIENT)
Dept: FAMILY MEDICINE CLINIC | Facility: CLINIC | Age: 39
End: 2024-02-06
Payer: COMMERCIAL

## 2024-02-06 NOTE — TELEPHONE ENCOUNTER
PATIENT HAS CALLED TO REPORT THAT THE MEDICATION   methocarbamol (ROBAXIN) 750 MG tablet  AND diclofenac (VOLTAREN) 75 MG EC tablet  EASES THE PAIN. PAIN IS STILL THERE BUT TAKING MEDICATION HAS MADE PAIN TOLERABLE.   PATIENT IS REQUESTING A CALL BACK AND OR A MESSAGE TO HER MY CHAT TO LET HER KNOW IF SHE SHOULD CONTINUE WITH TAKING THESE MEDICATIONS OR IF ALTERNATIVES WILL BE PRESCRIBED.    CALL BACK NUMBER [phone]

## 2024-02-14 DIAGNOSIS — M54.32 LEFT SCIATIC NERVE PAIN: ICD-10-CM

## 2024-02-14 DIAGNOSIS — M46.1 SI (SACROILIAC) JOINT INFLAMMATION: ICD-10-CM

## 2024-02-14 RX ORDER — METHOCARBAMOL 750 MG/1
750 TABLET, FILM COATED ORAL 3 TIMES DAILY
Qty: 21 TABLET | Refills: 0 | Status: SHIPPED | OUTPATIENT
Start: 2024-02-14

## 2024-03-05 DIAGNOSIS — E11.65 TYPE 2 DIABETES MELLITUS WITH HYPERGLYCEMIA, WITHOUT LONG-TERM CURRENT USE OF INSULIN: ICD-10-CM

## 2024-03-05 RX ORDER — SEMAGLUTIDE 1.34 MG/ML
1 INJECTION, SOLUTION SUBCUTANEOUS WEEKLY
Qty: 9 ML | Refills: 3 | Status: SHIPPED | OUTPATIENT
Start: 2024-03-05

## 2024-03-05 NOTE — TELEPHONE ENCOUNTER
"Caller: Kay Smith \"AWNOHEMI-PRAKASH\"    Relationship: Self    Best call back number: [phone]     Requested Prescriptions:   Requested Prescriptions     Pending Prescriptions Disp Refills    Ozempic, 1 MG/DOSE, 4 MG/3ML solution pen-injector 9 mL 3     Sig: Inject 1 mg under the skin into the appropriate area as directed 1 (One) Time Per Week.        Pharmacy where request should be sent: [facility] [address] [phone] [facility] [phone]      Last office visit with prescribing clinician: 1/29/2024   Last telemedicine visit with prescribing clinician: Visit date not found   Next office visit with prescribing clinician: Visit date not found     Additional details provided by patient: PATIENT WOULD LIKE TO BE NOTIFIED IF BLOOD WORK IS NEEDED FOR THE REFILL.    ALSO THE PATIENT WOULD LIKE TO KNOW IF HER INSURANCE IS ACCEPTED?    Does the patient have less than a 3 day supply:  [] Yes  [x] No    Would you like a call back once the refill request has been completed: [x] Yes [] No    If the office needs to give you a call back, can they leave a voicemail: [x] Yes [] No    Bulmaro Alba Rep   03/05/24 08:46 EST         "

## 2024-03-19 ENCOUNTER — TELEPHONE (OUTPATIENT)
Dept: FAMILY MEDICINE CLINIC | Facility: CLINIC | Age: 39
End: 2024-03-19

## 2024-03-19 NOTE — TELEPHONE ENCOUNTER
I called [facility] pharmacy [phone] and spoke to Megha. She states the Ozempic was rejected. That pharmacy is not par with her insurance plan she says. She said for pt to call [phone] regarding this. Called the patiient and she said she rec'd previous shipment from them and a bill from them that she paid to them. She will call and try to work out & let me know.

## 2024-03-19 NOTE — TELEPHONE ENCOUNTER
"  Caller: Darshana Smith \"AWE-SHA\"    Relationship: Self    Best call back number:      What is the best time to reach you: ANYTIME    Who are you requesting to speak with (clinical staff, provider,  specific staff member): CLINICAL STAFF    Do you know the name of the person who called: DARSHANA    What was the call regarding: PATIENT ADVISED SHE HAS NOT RECEIVED HER  MEDICATION; SHE ADVISED NOBODY HAS CALLED HER BACK TO SEE IF SHE NEEDS LABWORK FOR THE REFILL OR IF HER INSURANCE HAS BEEN ACCEPTED?  PATIENT IS OUT OF MEDICATION AND HAS NOTHING TO TAKE FOR THIS WEEKEND    MEDICATION: Ozempic, 1 MG/DOSE, 4 MG/3ML solution pen-injector   [facility] [address] [phone] [facility] [phone]       Is it okay if the provider responds through [email or web address]: PLEASE CALL    "

## 2024-03-20 ENCOUNTER — TELEPHONE (OUTPATIENT)
Dept: FAMILY MEDICINE CLINIC | Facility: CLINIC | Age: 39
End: 2024-03-20
Payer: COMMERCIAL

## 2024-03-20 NOTE — TELEPHONE ENCOUNTER
Ozempic approved. Called patient about what pharmacy to send it to after the whole thing yesterday (see previous note). She said they were already shipping it so I will have it scanned to her chart. By the way, [facility] is her correct pharmacy.

## 2024-03-20 NOTE — TELEPHONE ENCOUNTER
Key: [id number]  Initiated PA on cover my meds. Answered questions and attached note. Requested STAT review.

## 2024-04-03 ENCOUNTER — TELEPHONE (OUTPATIENT)
Dept: FAMILY MEDICINE CLINIC | Facility: CLINIC | Age: 39
End: 2024-04-03
Payer: COMMERCIAL

## 2024-04-03 NOTE — TELEPHONE ENCOUNTER
"        Hub staff attempted to follow warm transfer process and was unsuccessful     Caller: Kay Smith \"AWE-SHA\"    Relationship to patient: Self    Best call back number: [phone]    Patient is needing: AN ALTERNATIVE MEDICATION TO Ozempic, 1 MG/DOSE, 4 MG/3ML solution pen-injector.     PATIENT IS HAVING DIFFICULTY GETTING THIS MEDICATION THROUGH  [facility], [facility]. - [address] [phone] [facility] [phone] FX       "

## 2024-04-19 DIAGNOSIS — E11.65 TYPE 2 DIABETES MELLITUS WITH HYPERGLYCEMIA, WITHOUT LONG-TERM CURRENT USE OF INSULIN: ICD-10-CM

## 2024-04-19 RX ORDER — SEMAGLUTIDE 1.34 MG/ML
1 INJECTION, SOLUTION SUBCUTANEOUS WEEKLY
Qty: 9 ML | Refills: 3 | Status: SHIPPED | OUTPATIENT
Start: 2024-04-19

## 2024-04-19 NOTE — TELEPHONE ENCOUNTER
Pt has still not received her medication/ she hasn't been on the medication for a few weeks. She would like to have it sent to [facility] in [place].

## 2024-04-30 ENCOUNTER — TELEPHONE (OUTPATIENT)
Dept: FAMILY MEDICINE CLINIC | Facility: CLINIC | Age: 39
End: 2024-04-30
Payer: COMMERCIAL

## 2024-04-30 DIAGNOSIS — B00.1 COLD SORE: Primary | ICD-10-CM

## 2024-04-30 RX ORDER — VALACYCLOVIR HYDROCHLORIDE 1 G/1
1000 TABLET, FILM COATED ORAL 2 TIMES DAILY
Qty: 14 TABLET | Refills: 0 | Status: SHIPPED | OUTPATIENT
Start: 2024-04-30 | End: 2024-05-07

## 2024-04-30 NOTE — TELEPHONE ENCOUNTER
"Caller: Kay Smith \"AWNOHEMI-PRAKASH\"    Relationship: Self    Best call back number: [phone]     What medication are you requesting: VALCYCLOVIR    What are your current symptoms: COLD SORES    How long have you been experiencing symptoms: LAST WEEK    Have you had these symptoms before:    [x] Yes  [] No    Have you been treated for these symptoms before:   [x] Yes  [] No    If a prescription is needed, what is your preferred pharmacy and phone number: [address] [phone] [facility] [phone]      Additional notes:  PATIENT HAD A COLD SORE SHOW UP LAST WEEK.  "

## 2024-05-10 ENCOUNTER — OFFICE VISIT (OUTPATIENT)
Dept: FAMILY MEDICINE CLINIC | Facility: CLINIC | Age: 39
End: 2024-05-10
Payer: COMMERCIAL

## 2024-05-10 VITALS
WEIGHT: 213 LBS | DIASTOLIC BLOOD PRESSURE: 72 MMHG | HEIGHT: 66 IN | SYSTOLIC BLOOD PRESSURE: 120 MMHG | HEART RATE: 93 BPM | BODY MASS INDEX: 34.23 KG/M2 | OXYGEN SATURATION: 98 % | RESPIRATION RATE: 14 BRPM | TEMPERATURE: 97.1 F

## 2024-05-10 DIAGNOSIS — J02.9 SORE THROAT: Primary | ICD-10-CM

## 2024-05-10 DIAGNOSIS — J02.0 ACUTE STREPTOCOCCAL PHARYNGITIS: ICD-10-CM

## 2024-05-10 LAB
EXPIRATION DATE: NORMAL
INTERNAL CONTROL: NORMAL
Lab: NORMAL
S PYO AG THROAT QL: NEGATIVE

## 2024-05-10 PROCEDURE — 87880 STREP A ASSAY W/OPTIC: CPT | Performed by: NURSE PRACTITIONER

## 2024-05-10 PROCEDURE — 99214 OFFICE O/P EST MOD 30 MIN: CPT | Performed by: NURSE PRACTITIONER

## 2024-05-10 RX ORDER — AMOXICILLIN 875 MG/1
875 TABLET, COATED ORAL 2 TIMES DAILY
Qty: 20 TABLET | Refills: 0 | Status: SHIPPED | OUTPATIENT
Start: 2024-05-10 | End: 2024-05-20

## 2024-05-22 ENCOUNTER — TELEPHONE (OUTPATIENT)
Dept: FAMILY MEDICINE CLINIC | Facility: CLINIC | Age: 39
End: 2024-05-22
Payer: COMMERCIAL

## 2024-05-22 DIAGNOSIS — B37.9 ANTIBIOTIC-INDUCED YEAST INFECTION: Primary | ICD-10-CM

## 2024-05-22 DIAGNOSIS — T36.95XA ANTIBIOTIC-INDUCED YEAST INFECTION: Primary | ICD-10-CM

## 2024-05-22 NOTE — TELEPHONE ENCOUNTER
"    Caller: Kay Smith \"AWE-PRAKASH\"    Relationship: Self    Best call back number: [phone]     What medication are you requesting: MEDICATION FOR YEAST INFECTION     What are your current symptoms: PRONE TO GETTING YEAST INFECTIONS AND JUST FINISHED TAKING ANTIBIOTICS       If a prescription is needed, what is your preferred pharmacy and phone number: [address] [phone] [facility] [phone]      Additional notes:  PLEASE CALL AND ADVISE PATIENT IF AND WHEN THIS CAN BE SENT TO PHARMACY.  "

## 2024-05-22 NOTE — TELEPHONE ENCOUNTER
"Caller: Kay Smith \"AWE-SHA\"    Relationship: Self    Best call back number: [phone]     What was the call regarding: PATIENT STATES AT THE MOMENT SHE IS NOT EXPERIENCING ANY SYMPTOMS BUT SHE HAS BEEN TAKING AN ANTIBIOTIC AND WOULD LIKE TO GO AHEAD AND GET A MEDICATION PRESCRIBED FOR WHEN THE SYMPTOMS COME.        "

## 2024-05-23 RX ORDER — FLUCONAZOLE 150 MG/1
150 TABLET ORAL ONCE
Qty: 1 TABLET | Refills: 0 | Status: SHIPPED | OUTPATIENT
Start: 2024-05-23 | End: 2024-05-23

## 2024-07-09 ENCOUNTER — OFFICE VISIT (OUTPATIENT)
Dept: FAMILY MEDICINE CLINIC | Facility: CLINIC | Age: 39
End: 2024-07-09
Payer: COMMERCIAL

## 2024-07-09 VITALS
WEIGHT: 209 LBS | TEMPERATURE: 97.8 F | OXYGEN SATURATION: 98 % | SYSTOLIC BLOOD PRESSURE: 120 MMHG | HEIGHT: 66 IN | RESPIRATION RATE: 18 BRPM | BODY MASS INDEX: 33.59 KG/M2 | DIASTOLIC BLOOD PRESSURE: 62 MMHG | HEART RATE: 94 BPM

## 2024-07-09 DIAGNOSIS — U07.1 COVID-19 VIRUS DETECTED: Primary | ICD-10-CM

## 2024-07-09 DIAGNOSIS — R05.1 ACUTE COUGH: ICD-10-CM

## 2024-07-09 LAB
EXPIRATION DATE: ABNORMAL
FLUAV AG UPPER RESP QL IA.RAPID: NOT DETECTED
FLUBV AG UPPER RESP QL IA.RAPID: NOT DETECTED
INTERNAL CONTROL: ABNORMAL
Lab: ABNORMAL
SARS-COV-2 AG UPPER RESP QL IA.RAPID: DETECTED

## 2024-07-09 PROCEDURE — 87428 SARSCOV & INF VIR A&B AG IA: CPT | Performed by: FAMILY MEDICINE

## 2024-07-09 PROCEDURE — 99213 OFFICE O/P EST LOW 20 MIN: CPT | Performed by: FAMILY MEDICINE

## 2024-07-09 RX ORDER — DEXTROMETHORPHAN HYDROBROMIDE AND PROMETHAZINE HYDROCHLORIDE 15; 6.25 MG/5ML; MG/5ML
5 SYRUP ORAL 4 TIMES DAILY PRN
Qty: 120 ML | Refills: 0 | Status: SHIPPED | OUTPATIENT
Start: 2024-07-09

## 2024-07-09 NOTE — PROGRESS NOTES
Subjective   Kay Smith is a 39 y.o. female.     History of Present Illness     She has had congestion  Chills  Cough  ST  Ear pressure  Cough hurts    Just got back form the [facility]    The following portions of the patient's history were reviewed and updated as appropriate: allergies, current medications, past family history, past medical history, past social history, past surgical history, and problem list.    Review of Systems    Objective   Physical Exam  Vitals and nursing note reviewed.   Constitutional:       General: She is not in acute distress.     Appearance: Normal appearance. She is well-developed.   Cardiovascular:      Rate and Rhythm: Normal rate and regular rhythm.      Heart sounds: Normal heart sounds.   Pulmonary:      Effort: Pulmonary effort is normal.      Breath sounds: No wheezing or rhonchi.   Neurological:      Mental Status: She is alert and oriented to person, place, and time.   Psychiatric:         Mood and Affect: Mood normal.         Behavior: Behavior normal.         Thought Content: Thought content normal.         Judgment: Judgment normal.         Assessment & Plan   Diagnoses and all orders for this visit:    1. COVID-19 virus detected (Primary)  -     promethazine-dextromethorphan (PROMETHAZINE-DM) 6.25-15 MG/5ML syrup; Take 5 mL by mouth 4 (Four) Times a Day As Needed for Cough.  Dispense: 120 mL; Refill: 0  -     Nirmatrelvir & Ritonavir, 300mg/100mg, (PAXLOVID); Take 3 tablets by mouth 2 (Two) Times a Day.  Dispense: 30 each; Refill: 0    2. Acute cough  -     POCT SARS-CoV-2 Antigen RM + Flu    +COVID  Tret with paxlovid, off work this week.  Will complete FMLA if needed

## 2024-07-30 DIAGNOSIS — B00.1 COLD SORE: ICD-10-CM

## 2024-07-31 RX ORDER — VALACYCLOVIR HYDROCHLORIDE 1 G/1
1000 TABLET, FILM COATED ORAL 2 TIMES DAILY
Qty: 14 TABLET | Refills: 0 | Status: SHIPPED | OUTPATIENT
Start: 2024-07-31 | End: 2024-08-07

## 2024-08-07 ENCOUNTER — TELEPHONE (OUTPATIENT)
Dept: FAMILY MEDICINE CLINIC | Facility: CLINIC | Age: 39
End: 2024-08-07

## 2024-08-07 NOTE — TELEPHONE ENCOUNTER
Pt reports her blood sugar was in the 80s yesterday and in the 70s. Highest it has julio was 106. Pt reports nausea even when she is eating. Advised pt that if it persist or worsens she needs to proceed to [facility] and ER

## 2024-08-12 ENCOUNTER — OFFICE VISIT (OUTPATIENT)
Dept: FAMILY MEDICINE CLINIC | Facility: CLINIC | Age: 39
End: 2024-08-12
Payer: COMMERCIAL

## 2024-08-12 VITALS
HEIGHT: 66 IN | HEART RATE: 91 BPM | BODY MASS INDEX: 32.78 KG/M2 | DIASTOLIC BLOOD PRESSURE: 70 MMHG | RESPIRATION RATE: 14 BRPM | TEMPERATURE: 97.8 F | SYSTOLIC BLOOD PRESSURE: 108 MMHG | OXYGEN SATURATION: 100 % | WEIGHT: 204 LBS

## 2024-08-12 DIAGNOSIS — R11.2 NAUSEA AND VOMITING, UNSPECIFIED VOMITING TYPE: ICD-10-CM

## 2024-08-12 DIAGNOSIS — E11.65 TYPE 2 DIABETES MELLITUS WITH HYPERGLYCEMIA, WITHOUT LONG-TERM CURRENT USE OF INSULIN: Primary | ICD-10-CM

## 2024-08-12 DIAGNOSIS — Z13.6 SCREENING FOR CARDIOVASCULAR CONDITION: ICD-10-CM

## 2024-08-12 DIAGNOSIS — Z13.29 SCREENING FOR ENDOCRINE DISORDER: ICD-10-CM

## 2024-08-12 DIAGNOSIS — R19.7 DIARRHEA, UNSPECIFIED TYPE: ICD-10-CM

## 2024-08-12 LAB
BILIRUB BLD-MCNC: NEGATIVE MG/DL
CLARITY, POC: CLEAR
COLOR UR: YELLOW
EXPIRATION DATE: NORMAL
GLUCOSE UR STRIP-MCNC: NEGATIVE MG/DL
KETONES UR QL: NEGATIVE
LEUKOCYTE EST, POC: NEGATIVE
Lab: NORMAL
NITRITE UR-MCNC: POSITIVE MG/ML
PH UR: 6 [PH] (ref 5–8)
POC CREATININE URINE: NORMAL
POC MICROALBUMIN URINE: NORMAL
PROT UR STRIP-MCNC: ABNORMAL MG/DL
RBC # UR STRIP: NEGATIVE /UL
SP GR UR: 1.03 (ref 1–1.03)
UROBILINOGEN UR QL: ABNORMAL

## 2024-08-12 PROCEDURE — 82044 UR ALBUMIN SEMIQUANTITATIVE: CPT | Performed by: NURSE PRACTITIONER

## 2024-08-12 PROCEDURE — 81002 URINALYSIS NONAUTO W/O SCOPE: CPT | Performed by: NURSE PRACTITIONER

## 2024-08-12 PROCEDURE — 99214 OFFICE O/P EST MOD 30 MIN: CPT | Performed by: NURSE PRACTITIONER

## 2024-08-12 NOTE — PROGRESS NOTES
Date: 2024   Patient Name: Kay Smith  : 1985   MRN: [id number]     Chief Complaint:    Chief Complaint   Patient presents with    Hypoglycemia     With nausea for 3 days. She fought off vomitting. Blood sugar was running 70.       History of Present Illness: Kay Smith is a 39 y.o. female who is here today for Nausea, passing out sensation, BG has been running lower than normal     Takes Ozempic Monday    Diarrhea constantly     Denies any urinary issues    No new sex partners from 2019       Review of Systems:   Review of Systems   Gastrointestinal:  Positive for diarrhea and nausea.       Past Medical History:   Past Medical History:   Diagnosis Date    Borderline diabetes     Depression     Diabetes mellitus     Mild dysplasia of cervix     Normal Pap  and     Urinary tract infection        Past Surgical History: History reviewed. No pertinent surgical history.    Family History:   Family History   Problem Relation Age of Onset    Diabetes Paternal Aunt         Great Aunt  - adopted and raised Kay    Stroke Father 66    Osteoporosis Neg Hx        Social History:   Social History     Socioeconomic History    Marital status: Single   Tobacco Use    Smoking status: Never    Smokeless tobacco: Never   Vaping Use    Vaping status: Never Used   Substance and Sexual Activity    Alcohol use: No    Drug use: No    Sexual activity: Not Currently     Partners: Male     Birth control/protection: I.U.D., Abstinence     Comment: Mirena IUD 2017       Medications:     Current Outpatient Medications:     Blood Glucose Monitoring Suppl (ONE TOUCH ULTRA 2) w/Device kit, See Admin Instructions., Disp: , Rfl:     clobetasol (TEMOVATE) 0.05 % ointment, Apply 1 application  topically to the appropriate area as directed 2 (Two) Times a Day. Blitz:  1 application topically to affected area BID (AM & HS) x 2 wks; then 1x/day (HS) x 2 wks; then 2x/ week., Disp: 45 g, Rfl: 3    diclofenac  "(VOLTAREN) 75 MG EC tablet, Take 1 tablet by mouth 2 (Two) Times a Day., Disp: 60 tablet, Rfl: 2    hydrOXYzine (ATARAX) 25 MG tablet, Take 1 tablet by mouth Every 12 (Twelve) Hours., Disp: , Rfl:     Lancets (OneTouch Delica Plus [id number]) misc, USE TO CHECK GLUCOSE ONCE DAILY, Disp: , Rfl:     levonorgestrel (MIRENA) 20 MCG/24HR IUD, 1 each by Intrauterine route 1 (One) Time., Disp: , Rfl:     methocarbamol (ROBAXIN) 750 MG tablet, TAKE 1 TABLET BY MOUTH THREE TIMES DAILY FOR 7 DAYS, Disp: 21 tablet, Rfl: 0    OneTouch Ultra test strip, USE STRIP TO CHECK GLUCOSE ONCE DAILY, Disp: , Rfl:     Ozempic, 1 MG/DOSE, 4 MG/3ML solution pen-injector, Inject 1 mg under the skin into the appropriate area as directed 1 (One) Time Per Week., Disp: 9 mL, Rfl: 3    Nirmatrelvir & Ritonavir, 300mg/100mg, (PAXLOVID), Take 3 tablets by mouth 2 (Two) Times a Day., Disp: 30 each, Rfl: 0    promethazine-dextromethorphan (PROMETHAZINE-DM) 6.25-15 MG/5ML syrup, Take 5 mL by mouth 4 (Four) Times a Day As Needed for Cough. (Patient not taking: Reported on 8/12/2024), Disp: 120 mL, Rfl: 0    Allergies:   Allergies   Allergen Reactions    Metformin Diarrhea         Physical Exam:  Vital Signs:   Vitals:    08/12/24 1032   BP: 108/70   Pulse: 91   Resp: 14   Temp: 97.8 °F (36.6 °C)   SpO2: 100%   Weight: 92.5 kg (204 lb)   Height: 167.6 cm (66\")     Body mass index is 32.93 kg/m².     Physical Exam  Vitals and nursing note reviewed.   Constitutional:       Appearance: Normal appearance.   HENT:      Head: Normocephalic and atraumatic.   Cardiovascular:      Rate and Rhythm: Normal rate and regular rhythm.   Pulmonary:      Effort: Pulmonary effort is normal.      Breath sounds: Normal breath sounds.   Abdominal:      General: Bowel sounds are normal.   Skin:     General: Skin is warm.   Neurological:      General: No focal deficit present.      Mental Status: She is alert and oriented to person, place, and time.   Psychiatric:         Mood " and Affect: Mood normal.           Assessment/Plan:   Diagnoses and all orders for this visit:    1. Type 2 diabetes mellitus with hyperglycemia, without long-term current use of insulin (Primary)  Assessment & Plan:  Diabetes is stable.   Continue current treatment regimen.  Diabetes will be reassessed in 6 months    Orders:  -     Comprehensive Metabolic Panel  -     CBC Auto Differential  -     Hemoglobin A1c  -     POC Urinalysis Dipstick  -     POCT microalbumin    2. Screening for cardiovascular condition  -     Lipid Panel    3. Screening for endocrine disorder  -     T4, Free  -     TSH    4. Nausea and vomiting, unspecified vomiting type  Comments:  ordering labs and stool cultures.  Orders:  -     Amylase  -     Lipase  -     Gastrointestinal Panel, PCR - Stool, Per Rectum    5. Diarrhea, unspecified type  -     Amylase  -     Lipase  -     Gastrointestinal Panel, PCR - Stool, Per Rectum           Follow Up:   No follow-ups on file.    Paula Barry. CELESTE   [facility]

## 2024-08-13 ENCOUNTER — LAB (OUTPATIENT)
Dept: FAMILY MEDICINE CLINIC | Facility: CLINIC | Age: 39
End: 2024-08-13
Payer: COMMERCIAL

## 2024-08-13 LAB
ALBUMIN SERPL-MCNC: 4.4 G/DL (ref 3.9–4.9)
ALP SERPL-CCNC: 64 IU/L (ref 44–121)
ALT SERPL-CCNC: 12 IU/L (ref 0–32)
AMYLASE SERPL-CCNC: 69 U/L (ref 31–110)
AST SERPL-CCNC: 14 IU/L (ref 0–40)
BASOPHILS # BLD AUTO: 0 X10E3/UL (ref 0–0.2)
BASOPHILS NFR BLD AUTO: 0 %
BILIRUB SERPL-MCNC: 0.3 MG/DL (ref 0–1.2)
BUN SERPL-MCNC: 12 MG/DL (ref 6–20)
BUN/CREAT SERPL: 14 (ref 9–23)
CALCIUM SERPL-MCNC: 10.1 MG/DL (ref 8.7–10.2)
CHLORIDE SERPL-SCNC: 103 MMOL/L (ref 96–106)
CHOLEST SERPL-MCNC: 188 MG/DL (ref 100–199)
CO2 SERPL-SCNC: 20 MMOL/L (ref 20–29)
CREAT SERPL-MCNC: 0.83 MG/DL (ref 0.57–1)
EGFRCR SERPLBLD CKD-EPI 2021: 93 ML/MIN/1.73
EOSINOPHIL # BLD AUTO: 0.1 X10E3/UL (ref 0–0.4)
EOSINOPHIL NFR BLD AUTO: 1 %
ERYTHROCYTE [DISTWIDTH] IN BLOOD BY AUTOMATED COUNT: 13.1 % (ref 11.7–15.4)
GLOBULIN SER CALC-MCNC: 3.5 G/DL (ref 1.5–4.5)
GLUCOSE SERPL-MCNC: 82 MG/DL (ref 70–99)
HBA1C MFR BLD: 5.7 % (ref 4.8–5.6)
HCT VFR BLD AUTO: 42.9 % (ref 34–46.6)
HDLC SERPL-MCNC: 54 MG/DL
HGB BLD-MCNC: 13.8 G/DL (ref 11.1–15.9)
IMM GRANULOCYTES # BLD AUTO: 0 X10E3/UL (ref 0–0.1)
IMM GRANULOCYTES NFR BLD AUTO: 0 %
LDLC SERPL CALC-MCNC: 119 MG/DL (ref 0–99)
LIPASE SERPL-CCNC: 36 U/L (ref 14–72)
LYMPHOCYTES # BLD AUTO: 2.3 X10E3/UL (ref 0.7–3.1)
LYMPHOCYTES NFR BLD AUTO: 23 %
MCH RBC QN AUTO: 29 PG (ref 26.6–33)
MCHC RBC AUTO-ENTMCNC: 32.2 G/DL (ref 31.5–35.7)
MCV RBC AUTO: 90 FL (ref 79–97)
MONOCYTES # BLD AUTO: 0.7 X10E3/UL (ref 0.1–0.9)
MONOCYTES NFR BLD AUTO: 7 %
NEUTROPHILS # BLD AUTO: 6.7 X10E3/UL (ref 1.4–7)
NEUTROPHILS NFR BLD AUTO: 69 %
PLATELET # BLD AUTO: 337 X10E3/UL (ref 150–450)
POTASSIUM SERPL-SCNC: 4.1 MMOL/L (ref 3.5–5.2)
PROT SERPL-MCNC: 7.9 G/DL (ref 6–8.5)
RBC # BLD AUTO: 4.76 X10E6/UL (ref 3.77–5.28)
SODIUM SERPL-SCNC: 137 MMOL/L (ref 134–144)
T4 FREE SERPL-MCNC: 1.27 NG/DL (ref 0.82–1.77)
TRIGL SERPL-MCNC: 84 MG/DL (ref 0–149)
TSH SERPL DL<=0.005 MIU/L-ACNC: 3.11 UIU/ML (ref 0.45–4.5)
VLDLC SERPL CALC-MCNC: 15 MG/DL (ref 5–40)
WBC # BLD AUTO: 9.8 X10E3/UL (ref 3.4–10.8)

## 2024-08-15 LAB

## 2024-08-16 ENCOUNTER — TELEPHONE (OUTPATIENT)
Dept: FAMILY MEDICINE CLINIC | Facility: CLINIC | Age: 39
End: 2024-08-16

## 2024-08-16 NOTE — TELEPHONE ENCOUNTER
Pt saw her results for the gastro panel and was wanting to know about them. I informed her Paula hasn't reviewed them yet, although they've already been released to view.  Ok to release via [employer], and to call. Ok to leave detailed vm.

## 2024-09-09 ENCOUNTER — OFFICE VISIT (OUTPATIENT)
Dept: FAMILY MEDICINE CLINIC | Facility: CLINIC | Age: 39
End: 2024-09-09
Payer: COMMERCIAL

## 2024-09-09 VITALS
SYSTOLIC BLOOD PRESSURE: 110 MMHG | WEIGHT: 202 LBS | OXYGEN SATURATION: 98 % | RESPIRATION RATE: 14 BRPM | BODY MASS INDEX: 32.47 KG/M2 | TEMPERATURE: 97.8 F | HEART RATE: 80 BPM | DIASTOLIC BLOOD PRESSURE: 74 MMHG | HEIGHT: 66 IN

## 2024-09-09 DIAGNOSIS — E11.65 TYPE 2 DIABETES MELLITUS WITH HYPERGLYCEMIA, WITHOUT LONG-TERM CURRENT USE OF INSULIN: ICD-10-CM

## 2024-09-09 DIAGNOSIS — Z00.00 ANNUAL PHYSICAL EXAM: Primary | ICD-10-CM

## 2024-09-09 DIAGNOSIS — R19.7 DIARRHEA, UNSPECIFIED TYPE: ICD-10-CM

## 2024-09-09 PROCEDURE — 99395 PREV VISIT EST AGE 18-39: CPT | Performed by: NURSE PRACTITIONER

## 2024-09-09 RX ORDER — BIFIDOBACTERIUM LONGUM SUBSP. INFANTIS, AVOBENZONE, HOMOSALATE, OCTISALATE, OCTOCRYLENE, AND OXYBENZONE
2 KIT DAILY
Qty: 60 TABLET | Refills: 3 | Status: SHIPPED | OUTPATIENT
Start: 2024-09-09

## 2024-09-09 NOTE — ASSESSMENT & PLAN NOTE
Diabetes is stable.   Continue current treatment regimen.  Recommended an ADA diet.  Regular aerobic exercise.  Discussed foot care.  Reminded to get yearly retinal exam.  Diabetes will be reassessed in 6 months  Diabetes Education  Goal A1C 7 or less  Check glucose at least 1x per day unless otherwise specified  Yearly eye exams  Check feet daily  Eat low carb diet, low sugar  Increase water intake  Exercise 30-45 mins, 3-4x a week  Take meds as prescribed

## 2024-09-09 NOTE — PROGRESS NOTES
Patient Name: Kay Smith  : 1985   MRN: [id number]     Chief Complaint:    Chief Complaint   Patient presents with    Annual Exam       History of Present Illness: Kay Smith is a 39 y.o. female who is here today for their annual health maintenance and physical. 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       Past Medical History, Social History, Family History and Care Team were all reviewed with patient and updated as appropriate.     Medications:     Current Outpatient Medications:     Blood Glucose Monitoring Suppl (ONE TOUCH ULTRA 2) w/Device kit, See Admin Instructions., Disp: , Rfl:     clobetasol (TEMOVATE) 0.05 % ointment, Apply 1 application  topically to the appropriate area as directed 2 (Two) Times a Day. Blitz:  1 application topically to affected area BID (AM & HS) x 2 wks; then 1x/day (HS) x 2 wks; then 2x/ week., Disp: 45 g, Rfl: 3    diclofenac (VOLTAREN) 75 MG EC tablet, Take 1 tablet by mouth 2 (Two) Times a Day., Disp: 60 tablet, Rfl: 2    hydrOXYzine (ATARAX) 25 MG tablet, Take 1 tablet by mouth Every 12 (Twelve) Hours., Disp: , Rfl:     Lancets (OneTouch Delica Plus [id number]) misc, USE TO CHECK GLUCOSE ONCE DAILY, Disp: , Rfl:     levonorgestrel (MIRENA) 20 MCG/24HR IUD, 1 each by Intrauterine route 1 (One) Time., Disp: , Rfl:     methocarbamol (ROBAXIN) 750 MG tablet, TAKE 1 TABLET BY MOUTH THREE TIMES DAILY FOR 7 DAYS, Disp: 21 tablet, Rfl: 0    OneTouch Ultra test strip, USE STRIP TO CHECK GLUCOSE ONCE DAILY, Disp: , Rfl:     Ozempic, 1 MG/DOSE,  "4 MG/3ML solution pen-injector, Inject 1 mg under the skin into the appropriate area as directed 1 (One) Time Per Week., Disp: 9 mL, Rfl: 3    Bacillus Coagulans-Inulin (Align Prebiotic-Probiotic) 5-1.25 MG-GM chewable tablet, Chew 2 Pieces Daily., Disp: 60 tablet, Rfl: 3    Allergies:   Allergies   Allergen Reactions    Metformin Diarrhea         Depression: PHQ-2 Depression Screening  PHQ-2 Total Score:     PHQ-9 Total Score:       Intimate partner violence: (Screen on initial visit, pregnant women, women with injuries, older adult with injury or evidence of neglect):  Violence can be a problem in many people's lives, so I now ask every patient about trauma or abuse they may have experienced in a relationship.  Stress/Safety - Do you feel safe in your relationship?  Afraid/Abused - Have you ever been in a relationship where you were threatened, hurt, or afraid?  Friend/Family - Are your friends aware you have been hurt?  Emergency Plan - Do you have a safe place to go and the resources you need in an emergency?    Osteoporosis:   Ost menopausal women < 65 with RF (advancing age, previous fracture, glucocorticoid therapy, parental hip fracture, low body weight, current cigarette smoking, excessive alcohol consumption, rheumatoid arthritis, secondary osteoporosis [hypogonadism/premature menopause, malabsorption, chronic liver disease, IBD]).  All women 65 or older      Physical Exam:  Vital Signs:   Vitals:    09/09/24 0958   BP: 110/74   Pulse: 80   Resp: 14   Temp: 97.8 °F (36.6 °C)   SpO2: 98%   Weight: 91.6 kg (202 lb)   Height: 167.6 cm (66\")     Body mass index is 32.6 kg/m².     Physical Exam  Vitals and nursing note reviewed.   Constitutional:       General: She is awake.      Appearance: Normal appearance. She is well-developed. She is obese.   HENT:      Head: Normocephalic and atraumatic.      Right Ear: Tympanic membrane, ear canal and external ear normal.      Left Ear: Tympanic membrane, ear canal and " external ear normal.      Nose: Nose normal.      Mouth/Throat:      Mouth: Mucous membranes are moist.      Pharynx: Oropharynx is clear.   Eyes:      Extraocular Movements: Extraocular movements intact.      Conjunctiva/sclera: Conjunctivae normal.      Pupils: Pupils are equal, round, and reactive to light.   Neck:      Thyroid: No thyromegaly or thyroid tenderness.   Cardiovascular:      Rate and Rhythm: Normal rate and regular rhythm.      Pulses: Normal pulses.      Heart sounds: Normal heart sounds.   Pulmonary:      Effort: Pulmonary effort is normal.      Breath sounds: Normal breath sounds.   Abdominal:      General: Bowel sounds are normal.      Palpations: Abdomen is soft.   Musculoskeletal:         General: Normal range of motion.      Cervical back: Normal range of motion and neck supple.      Right lower leg: No edema.      Left lower leg: No edema.   Lymphadenopathy:      Cervical: No cervical adenopathy.   Skin:     General: Skin is warm.      Capillary Refill: Capillary refill takes less than 2 seconds.   Neurological:      General: No focal deficit present.      Mental Status: She is alert and oriented to person, place, and time.   Psychiatric:         Mood and Affect: Mood normal.         Behavior: Behavior normal. Behavior is cooperative.         Thought Content: Thought content normal.         Judgment: Judgment normal.         Procedures      Assessment/Plan:   Diagnoses and all orders for this visit:    1. Annual physical exam (Primary)  Assessment & Plan:  Health maintenance:  Continue routine health maintenance including routine dentistry, eye exam, safety seatbelt use, exercise, and proper nutrition.   Exercise 3-4 times a week, 30-45 mins a day  Increase water intake  Monitor for acute illnesses        2. Diarrhea, unspecified type  Comments:  avoid triggering foods, starting on a pre-probiotic to aid in symptoms, drink plenty of water  Orders:  -     Bacillus Coagulans-Inulin (Align  Prebiotic-Probiotic) 5-1.25 MG-GM chewable tablet; Chew 2 Pieces Daily.  Dispense: 60 tablet; Refill: 3    3. Type 2 diabetes mellitus with hyperglycemia, without long-term current use of insulin  Assessment & Plan:  Diabetes is stable.   Continue current treatment regimen.  Recommended an ADA diet.  Regular aerobic exercise.  Discussed foot care.  Reminded to get yearly retinal exam.  Diabetes will be reassessed in 6 months  Diabetes Education  Goal A1C 7 or less  Check glucose at least 1x per day unless otherwise specified  Yearly eye exams  Check feet daily  Eat low carb diet, low sugar  Increase water intake  Exercise 30-45 mins, 3-4x a week  Take meds as prescribed               Follow Up:   Return in about 6 months (around 3/9/2025) for Recheck chronic care management.    Healthcare Maintenance:   Counseling provided on preventative care and vaccines.   Kay Smith voices understanding and acceptance of this advice and will call back with any further questions or concerns. AVS with preventive healthcare tips printed for patient.     Paula Barry. CELESTE   [facility]

## 2024-09-20 ENCOUNTER — TELEPHONE (OUTPATIENT)
Dept: FAMILY MEDICINE CLINIC | Facility: CLINIC | Age: 39
End: 2024-09-20
Payer: COMMERCIAL

## 2024-09-20 DIAGNOSIS — R19.7 DIARRHEA, UNSPECIFIED TYPE: ICD-10-CM

## 2024-09-20 RX ORDER — BIFIDOBACTERIUM LONGUM SUBSP. INFANTIS, AVOBENZONE, HOMOSALATE, OCTISALATE, OCTOCRYLENE, AND OXYBENZONE
2 KIT DAILY
Qty: 60 TABLET | Refills: 3 | Status: SHIPPED | OUTPATIENT
Start: 2024-09-20

## 2024-09-26 ENCOUNTER — OFFICE VISIT (OUTPATIENT)
Dept: OBSTETRICS AND GYNECOLOGY | Facility: CLINIC | Age: 39
End: 2024-09-26
Payer: COMMERCIAL

## 2024-09-26 VITALS
WEIGHT: 201.6 LBS | BODY MASS INDEX: 32.4 KG/M2 | HEIGHT: 66 IN | DIASTOLIC BLOOD PRESSURE: 74 MMHG | SYSTOLIC BLOOD PRESSURE: 110 MMHG

## 2024-09-26 DIAGNOSIS — Z30.431 ENCOUNTER FOR ROUTINE CHECKING OF INTRAUTERINE CONTRACEPTIVE DEVICE (IUD): ICD-10-CM

## 2024-09-26 DIAGNOSIS — Z01.419 ENCOUNTER FOR GYNECOLOGICAL EXAMINATION WITHOUT ABNORMAL FINDING: Primary | ICD-10-CM

## 2024-10-17 ENCOUNTER — TELEPHONE (OUTPATIENT)
Dept: FAMILY MEDICINE CLINIC | Facility: CLINIC | Age: 39
End: 2024-10-17
Payer: COMMERCIAL

## 2024-10-17 NOTE — TELEPHONE ENCOUNTER
Received PA request and initiated on CMM.   Response:  Information regarding your request  The member recently filled this medication and will be able to return for their next refill according to their plan limits.

## 2024-11-08 ENCOUNTER — TELEPHONE (OUTPATIENT)
Dept: FAMILY MEDICINE CLINIC | Facility: CLINIC | Age: 39
End: 2024-11-08
Payer: COMMERCIAL

## 2024-11-08 NOTE — TELEPHONE ENCOUNTER
PATIENT HAS CALLED TO LET PCP KNOW THAT HER INSURANCE IS NOT COVERING OZEMPIC LIKE IT WAS BEFORE. PATIENT IS REQUESTING A CALL BACK TO ADVISE ON WHAT THE PCP WOULD LIKE TO DO IN THE NEXT STEPS OF HER PLAN OF CARE.     CALL BACK NUMBER [phone]

## 2024-11-14 NOTE — TELEPHONE ENCOUNTER
Spoke to pharmacy & they stated pt no longer has medicaid as secondary. She has to pay $105 for 3 month supply now. Please advise.

## 2024-11-15 NOTE — TELEPHONE ENCOUNTER
That is 35 dollars a month, can she pay for it monthly, instead of 3 month supply? We could try a coupon card. Which she can also get online at [email or web address]

## 2024-11-15 NOTE — TELEPHONE ENCOUNTER
Lvm for pt with that option. Also recommended [facility]. Asked her call and let us know how she wants to proceed.

## 2025-01-22 DIAGNOSIS — E11.65 TYPE 2 DIABETES MELLITUS WITH HYPERGLYCEMIA, WITHOUT LONG-TERM CURRENT USE OF INSULIN: Primary | ICD-10-CM

## 2025-01-22 RX ORDER — LANCETS 33 GAUGE
1 EACH MISCELLANEOUS DAILY
Qty: 100 EACH | Refills: 1 | Status: SHIPPED | OUTPATIENT
Start: 2025-01-22

## 2025-01-22 NOTE — TELEPHONE ENCOUNTER
"Caller: Kay Smith \"AWE-PRAKASH\"    Relationship: Self    Best call back number: [phone]     Requested Prescriptions:   Requested Prescriptions     Pending Prescriptions Disp Refills    Lancets (OneTouch Delica Plus [id number]) [facility]          Pharmacy where request should be sent: [facility] [address] [place] [address] [phone] [facility] [phone] FX     Last office visit with prescribing clinician: 9/9/2024   Last telemedicine visit with prescribing clinician: Visit date not found   Next office visit with prescribing clinician: Visit date not found     Additional details provided by patient:     Does the patient have less than a 3 day supply:  [x] Yes  [] No    Would you like a call back once the refill request has been completed: [] Yes [x] No    If the office needs to give you a call back, can they leave a voicemail: [] Yes [x] No    Bulmaro Goyal Rep   01/22/25 08:51 EST   "

## 2025-05-04 DIAGNOSIS — E11.65 TYPE 2 DIABETES MELLITUS WITH HYPERGLYCEMIA, WITHOUT LONG-TERM CURRENT USE OF INSULIN: ICD-10-CM

## 2025-05-05 RX ORDER — SEMAGLUTIDE 1.34 MG/ML
1 INJECTION, SOLUTION SUBCUTANEOUS WEEKLY
Qty: 3 ML | Refills: 0 | Status: SHIPPED | OUTPATIENT
Start: 2025-05-05

## 2025-05-27 ENCOUNTER — OFFICE VISIT (OUTPATIENT)
Dept: FAMILY MEDICINE CLINIC | Facility: CLINIC | Age: 40
End: 2025-05-27
Payer: COMMERCIAL

## 2025-05-27 VITALS
HEIGHT: 66 IN | RESPIRATION RATE: 14 BRPM | WEIGHT: 202.8 LBS | BODY MASS INDEX: 32.59 KG/M2 | TEMPERATURE: 97.3 F | DIASTOLIC BLOOD PRESSURE: 70 MMHG | HEART RATE: 87 BPM | OXYGEN SATURATION: 98 % | SYSTOLIC BLOOD PRESSURE: 120 MMHG

## 2025-05-27 DIAGNOSIS — Z13.29 SCREENING FOR ENDOCRINE DISORDER: ICD-10-CM

## 2025-05-27 DIAGNOSIS — E55.9 VITAMIN D DEFICIENCY: ICD-10-CM

## 2025-05-27 DIAGNOSIS — D64.9 ANEMIA, UNSPECIFIED TYPE: ICD-10-CM

## 2025-05-27 DIAGNOSIS — K21.9 GASTROESOPHAGEAL REFLUX DISEASE WITHOUT ESOPHAGITIS: ICD-10-CM

## 2025-05-27 DIAGNOSIS — R11.0 NAUSEA: ICD-10-CM

## 2025-05-27 DIAGNOSIS — R10.84 GENERALIZED ABDOMINAL PAIN: ICD-10-CM

## 2025-05-27 DIAGNOSIS — E11.65 TYPE 2 DIABETES MELLITUS WITH HYPERGLYCEMIA, WITHOUT LONG-TERM CURRENT USE OF INSULIN: Primary | ICD-10-CM

## 2025-05-27 PROCEDURE — 99214 OFFICE O/P EST MOD 30 MIN: CPT | Performed by: NURSE PRACTITIONER

## 2025-05-27 RX ORDER — OMEPRAZOLE 40 MG/1
40 CAPSULE, DELAYED RELEASE ORAL DAILY
Qty: 30 CAPSULE | Refills: 1 | Status: SHIPPED | OUTPATIENT
Start: 2025-05-27

## 2025-05-28 LAB
25(OH)D3+25(OH)D2 SERPL-MCNC: 18.1 NG/ML (ref 30–100)
ALBUMIN SERPL-MCNC: 4.2 G/DL (ref 3.9–4.9)
ALP SERPL-CCNC: 59 IU/L (ref 44–121)
ALT SERPL-CCNC: 13 IU/L (ref 0–32)
AST SERPL-CCNC: 15 IU/L (ref 0–40)
BASOPHILS # BLD AUTO: 0 X10E3/UL (ref 0–0.2)
BASOPHILS NFR BLD AUTO: 0 %
BILIRUB SERPL-MCNC: <0.2 MG/DL (ref 0–1.2)
BUN SERPL-MCNC: 11 MG/DL (ref 6–20)
BUN/CREAT SERPL: 17 (ref 9–23)
CALCIUM SERPL-MCNC: 9.8 MG/DL (ref 8.7–10.2)
CHLORIDE SERPL-SCNC: 108 MMOL/L (ref 96–106)
CO2 SERPL-SCNC: 19 MMOL/L (ref 20–29)
CREAT SERPL-MCNC: 0.66 MG/DL (ref 0.57–1)
EGFRCR SERPLBLD CKD-EPI 2021: 115 ML/MIN/1.73
EOSINOPHIL # BLD AUTO: 0.1 X10E3/UL (ref 0–0.4)
EOSINOPHIL NFR BLD AUTO: 2 %
ERYTHROCYTE [DISTWIDTH] IN BLOOD BY AUTOMATED COUNT: 12.6 % (ref 11.7–15.4)
FERRITIN SERPL-MCNC: 109 NG/ML (ref 15–150)
GLOBULIN SER CALC-MCNC: 3.1 G/DL (ref 1.5–4.5)
GLUCOSE SERPL-MCNC: 84 MG/DL (ref 70–99)
HBA1C MFR BLD: 5.5 % (ref 4.8–5.6)
HCT VFR BLD AUTO: 42.7 % (ref 34–46.6)
HGB BLD-MCNC: 13.8 G/DL (ref 11.1–15.9)
IMM GRANULOCYTES # BLD AUTO: 0 X10E3/UL (ref 0–0.1)
IMM GRANULOCYTES NFR BLD AUTO: 0 %
IRON SATN MFR SERPL: 20 % (ref 15–55)
IRON SERPL-MCNC: 65 UG/DL (ref 27–159)
LYMPHOCYTES # BLD AUTO: 2 X10E3/UL (ref 0.7–3.1)
LYMPHOCYTES NFR BLD AUTO: 35 %
MCH RBC QN AUTO: 29.4 PG (ref 26.6–33)
MCHC RBC AUTO-ENTMCNC: 32.3 G/DL (ref 31.5–35.7)
MCV RBC AUTO: 91 FL (ref 79–97)
MONOCYTES # BLD AUTO: 0.5 X10E3/UL (ref 0.1–0.9)
MONOCYTES NFR BLD AUTO: 8 %
NEUTROPHILS # BLD AUTO: 3.1 X10E3/UL (ref 1.4–7)
NEUTROPHILS NFR BLD AUTO: 55 %
PLATELET # BLD AUTO: 297 X10E3/UL (ref 150–450)
POTASSIUM SERPL-SCNC: 4.1 MMOL/L (ref 3.5–5.2)
PROT SERPL-MCNC: 7.3 G/DL (ref 6–8.5)
RBC # BLD AUTO: 4.7 X10E6/UL (ref 3.77–5.28)
SODIUM SERPL-SCNC: 138 MMOL/L (ref 134–144)
T4 FREE SERPL-MCNC: 0.97 NG/DL (ref 0.82–1.77)
TIBC SERPL-MCNC: 326 UG/DL (ref 250–450)
TSH SERPL DL<=0.005 MIU/L-ACNC: 1.44 UIU/ML (ref 0.45–4.5)
UIBC SERPL-MCNC: 261 UG/DL (ref 131–425)
WBC # BLD AUTO: 5.7 X10E3/UL (ref 3.4–10.8)

## 2025-05-29 ENCOUNTER — TELEPHONE (OUTPATIENT)
Dept: FAMILY MEDICINE CLINIC | Facility: CLINIC | Age: 40
End: 2025-05-29
Payer: COMMERCIAL

## 2025-05-29 NOTE — TELEPHONE ENCOUNTER
"  Caller: Kay Smith \"AWE-PRAKASH\"    Relationship: Self    Best call back number: [phone]       What test was performed: LABS    When was the test performed: 5/27/25    Where was the test performed: IN OFFICE    Additional notes: PATIENT IS CONCERNED ABOUT ABNORMAL RESULTS            "

## 2025-05-29 NOTE — PROGRESS NOTES
Date: 2025   Patient Name: Kay Smith  : 1985   MRN: [id number]     Chief Complaint:    Chief Complaint   Patient presents with    Dizziness     Nausea she thinks due to not having Ozempic due to job change.        History of Present Illness: Kay Smith is a 40 y.o. female who is here today to follow up for Dizziness  Symptoms include abdominal pain and nausea.         History of Present Illness  The patient presents for evaluation of gastrointestinal symptoms and social phobia.    She experienced a lapse in her insurance, which resulted in a 3-week discontinuation of her Ozempic medication. Her symptoms, including dizziness, nausea, faintness, and persistent fatigue, began 2 days prior to resuming the medication on 05/15/2025. The severity of these symptoms has remained consistent over the past week, with a slight alleviation in nausea and faintness. She reports no episodes of vomiting or diarrhea. There have been instances where she felt extremely faint and had to turn on a fan to prevent fainting. She has a history of gastrointestinal symptoms, which were previously attributed to an E. coli infection identified through stool sample analysis last year. She reports that her symptoms intensify postprandially, occasionally necessitating immediate cessation of eating. She recalls an incident where she nearly vomited after consuming a piece of sushi while feeling satiated. She is unable to consume caffeine due to sensitivity.    She also reports a lack of interest in social activities, preferring to isolate herself at home. She expresses discomfort in crowded places and avoids them as much as possible. She has been in a relationship since 2016 but is uncertain about its future due to her desire for solitude. She has previously attempted antidepressant therapy but discontinued it due to associated irritability.    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       Review of Systems:   Review of Systems   Gastrointestinal:  Positive for abdominal pain and nausea.   Neurological:  Positive for dizziness.   Psychiatric/Behavioral:  The patient is nervous/anxious.        I have reviewed the patients family history, social history, past medical history, past surgical history and have updated it as appropriate.     Medications:     Current Outpatient Medications:     Blood Glucose Monitoring Suppl (ONE TOUCH ULTRA 2) w/Device kit, See Admin Instructions., Disp: , Rfl:     clobetasol (TEMOVATE) 0.05 % ointment, Apply 1 application  topically to the appropriate area as directed 2 (Two) Times a Day. Blitz:  1 application topically to affected area BID (AM & HS) x 2 wks; then 1x/day (HS) x 2 wks; then 2x/ week., Disp: 45 g, Rfl: 3    diclofenac (VOLTAREN) 75 MG EC tablet, Take 1 tablet by mouth 2 (Two) Times a Day., Disp: 60 tablet, Rfl: 2    hydrOXYzine (ATARAX) 25 MG tablet, Take 1 tablet by mouth Every 12 (Twelve) Hours., Disp: , Rfl:     Lancets (OneTouch Delica Plus [id number]) misc, 1 Lancet by Other route Daily. Check BG daily, Disp: 100 each, Rfl: 1    levonorgestrel (MIRENA) 20 MCG/24HR IUD, 1 each by Intrauterine route 1 (One) Time., Disp: , Rfl:     methocarbamol (ROBAXIN) 750 MG tablet, TAKE 1 TABLET BY MOUTH THREE TIMES DAILY FOR 7 DAYS, Disp: 21 tablet, Rfl: 0    OneTouch Ultra test strip, USE STRIP TO CHECK GLUCOSE ONCE DAILY, Disp: , Rfl:     Ozempic, 1 MG/DOSE, 4 MG/3ML solution pen-injector, INJECT 1 MG SUBCUTANEOUSLY ONCE A WEEK, Disp: 3 mL, Rfl: 0    omeprazole (priLOSEC) 40 MG capsule, Take 1 capsule by mouth Daily., Disp: 30 capsule, Rfl: 1    Allergies:   Allergies   Allergen Reactions    Metformin Diarrhea       PHQ-9 Total Score:      Physical Exam:  Vital Signs:   Vitals:    05/27/25 1441   BP: 120/70   Pulse: 87   Resp: 14   Temp: 97.3 °F (36.3 °C)   SpO2: 98%   Weight: 92 kg (202 lb 12.8 oz)   Height: 167.6 cm (66\")     Body mass index is " 32.73 kg/m².           Physical Exam  Vitals and nursing note reviewed.   Constitutional:       General: She is awake.      Appearance: Normal appearance. She is well-developed.   HENT:      Head: Normocephalic and atraumatic.   Cardiovascular:      Rate and Rhythm: Normal rate and regular rhythm.   Pulmonary:      Effort: Pulmonary effort is normal.      Breath sounds: Normal breath sounds.   Abdominal:      General: Bowel sounds are normal.      Palpations: Abdomen is soft.      Tenderness: There is no abdominal tenderness.   Neurological:      General: No focal deficit present.      Mental Status: She is alert and oriented to person, place, and time.   Psychiatric:         Attention and Perception: Attention normal.         Mood and Affect: Mood is not anxious or depressed. Affect is not flat.         Speech: Speech normal.         Behavior: Behavior is cooperative.         Thought Content: Thought content normal.         Cognition and Memory: Cognition is not impaired. Memory is not impaired.         Judgment: Judgment normal.           Assessment/Plan:   Diagnoses and all orders for this visit:    1. Type 2 diabetes mellitus with hyperglycemia, without long-term current use of insulin (Primary)  -     Comprehensive Metabolic Panel  -     Hemoglobin A1c  -     CBC & Differential    2. Vitamin D deficiency  -     Vitamin D,25-Hydroxy    3. Anemia, unspecified type  -     Iron Profile w/o Ferritin  -     Ferritin    4. Screening for endocrine disorder  -     T4, Free  -     TSH    5. Nausea    6. Generalized abdominal pain    7. Gastroesophageal reflux disease without esophagitis  -     omeprazole (priLOSEC) 40 MG capsule; Take 1 capsule by mouth Daily.  Dispense: 30 capsule; Refill: 1       Assessment & Plan  1. DM, fatigue and Gastrointestinal symptoms.  - Symptoms of nausea, dizziness, and fatigue are likely related to the resumption of Ozempic.  - Reports that nausea and fainting have improved, but persistent  fatigue remains.  - Prescription for pantoprazole provided to manage acid reflux and associated nausea; advised to take it in the morning.  - Laboratory tests will be conducted to assess iron levels and other relevant parameters.    2. Social phobia  - Exhibits signs of social phobia, including avoiding crowded places and feeling anxious around people.  - Described feeling uncomfortable and anxious in social settings, preferring isolation.  - Informed about the availability of medications that can help manage social phobia; encouraged to consider these options.  - Advised to set goals to gradually increase social interactions and explore potential treatments.    Patient or patient representative verbalized consent for the use of Ambient Listening during the visit with  CELESTE Ruth for chart documentation. 5/29/2025  10:53 EDT      Follow Up:   Return in about 3 months (around 8/27/2025) for Recheck.      Paula Barry. CELESTE   [facility]

## 2025-05-30 DIAGNOSIS — E11.65 TYPE 2 DIABETES MELLITUS WITH HYPERGLYCEMIA, WITHOUT LONG-TERM CURRENT USE OF INSULIN: ICD-10-CM

## 2025-05-30 RX ORDER — SEMAGLUTIDE 1.34 MG/ML
1 INJECTION, SOLUTION SUBCUTANEOUS WEEKLY
Qty: 3 ML | Refills: 0 | Status: SHIPPED | OUTPATIENT
Start: 2025-05-30

## 2025-06-25 DIAGNOSIS — E11.65 TYPE 2 DIABETES MELLITUS WITH HYPERGLYCEMIA, WITHOUT LONG-TERM CURRENT USE OF INSULIN: ICD-10-CM

## 2025-06-25 RX ORDER — SEMAGLUTIDE 1.34 MG/ML
1 INJECTION, SOLUTION SUBCUTANEOUS WEEKLY
Qty: 3 ML | Refills: 0 | Status: SHIPPED | OUTPATIENT
Start: 2025-06-25

## 2025-07-16 ENCOUNTER — PRIOR AUTHORIZATION (OUTPATIENT)
Dept: FAMILY MEDICINE CLINIC | Facility: CLINIC | Age: 40
End: 2025-07-16

## 2025-07-16 ENCOUNTER — OFFICE VISIT (OUTPATIENT)
Dept: FAMILY MEDICINE CLINIC | Facility: CLINIC | Age: 40
End: 2025-07-16
Payer: COMMERCIAL

## 2025-07-16 ENCOUNTER — TELEPHONE (OUTPATIENT)
Dept: FAMILY MEDICINE CLINIC | Facility: CLINIC | Age: 40
End: 2025-07-16

## 2025-07-16 VITALS
BODY MASS INDEX: 32.27 KG/M2 | SYSTOLIC BLOOD PRESSURE: 108 MMHG | TEMPERATURE: 98 F | HEART RATE: 84 BPM | RESPIRATION RATE: 14 BRPM | DIASTOLIC BLOOD PRESSURE: 76 MMHG | OXYGEN SATURATION: 99 % | HEIGHT: 66 IN | WEIGHT: 200.8 LBS

## 2025-07-16 DIAGNOSIS — R35.0 URINARY FREQUENCY: Primary | ICD-10-CM

## 2025-07-16 DIAGNOSIS — E11.65 TYPE 2 DIABETES MELLITUS WITH HYPERGLYCEMIA, WITHOUT LONG-TERM CURRENT USE OF INSULIN: ICD-10-CM

## 2025-07-16 DIAGNOSIS — R31.9 HEMATURIA, UNSPECIFIED TYPE: ICD-10-CM

## 2025-07-16 DIAGNOSIS — N93.9 ABNORMAL UTERINE BLEEDING: ICD-10-CM

## 2025-07-16 LAB
B-HCG UR QL: NEGATIVE
BILIRUB BLD-MCNC: NEGATIVE MG/DL
CLARITY, POC: CLEAR
COLOR UR: YELLOW
EXPIRATION DATE: ABNORMAL
EXPIRATION DATE: NORMAL
EXPIRATION DATE: NORMAL
GLUCOSE UR STRIP-MCNC: NEGATIVE MG/DL
INTERNAL NEGATIVE CONTROL: NEGATIVE
INTERNAL POSITIVE CONTROL: POSITIVE
KETONES UR QL: NEGATIVE
LEUKOCYTE EST, POC: NEGATIVE
Lab: ABNORMAL
Lab: NORMAL
Lab: NORMAL
NITRITE UR-MCNC: NEGATIVE MG/ML
PH UR: 6 [PH] (ref 5–8)
POC ALBUMIN, URINE: NORMAL MG/L
POC CREATININE, URINE: NORMAL MG/DL
POC URINE ALB/CREA RATIO: NORMAL
PROT UR STRIP-MCNC: NEGATIVE MG/DL
RBC # UR STRIP: ABNORMAL /UL
SP GR UR: 1.03 (ref 1–1.03)
UROBILINOGEN UR QL: ABNORMAL

## 2025-07-16 PROCEDURE — 82570 ASSAY OF URINE CREATININE: CPT | Performed by: NURSE PRACTITIONER

## 2025-07-16 PROCEDURE — 81025 URINE PREGNANCY TEST: CPT | Performed by: NURSE PRACTITIONER

## 2025-07-16 PROCEDURE — 81003 URINALYSIS AUTO W/O SCOPE: CPT | Performed by: NURSE PRACTITIONER

## 2025-07-16 PROCEDURE — 99214 OFFICE O/P EST MOD 30 MIN: CPT | Performed by: NURSE PRACTITIONER

## 2025-07-16 PROCEDURE — 82044 UR ALBUMIN SEMIQUANTITATIVE: CPT | Performed by: NURSE PRACTITIONER

## 2025-07-16 NOTE — TELEPHONE ENCOUNTER
Caller: [facility] [address] [place] [address] ESPERANZA [facility] [phone] [facility] [phone] FX    Relationship to patient: Pharmacy    Patient is needing: Tirzepatide 2.5 MG/0.5ML solution auto-injector WHICH ONE DOES PCP WANT PATIENT TO HAVE PLEASE ADVISE. RYANNE BYRD.

## 2025-07-16 NOTE — TELEPHONE ENCOUNTER
Mounjaro PA  (Key: [id number]) Sent    Questions state that patient must have had a trial and inadequate response or intolerance to two preferred GLP-1 receptor agonists. NOTE: Physical documentation must be provided. The preferred GLP-1 receptor agonist agents are: Ozempic, Trulicity, and liraglutide (generic Victoza).  Patient has tried Rybelsus, metformin, and Ozempic. Must try Trulicity or liraglutide.

## 2025-07-16 NOTE — PROGRESS NOTES
Date: 2025   Patient Name: Kay Smith  : 1985   MRN: [id number]     Chief Complaint:    Chief Complaint   Patient presents with    Fatigue     Gaining weight, tired, urinary frequency, diarrhea for 2 months       History of Present Illness: Kay Smith is a 40 y.o. female who is here today to follow up for HPI     History of Present Illness  The patient presents for evaluation of increased urination, fatigue, and bleeding.    She reports experiencing unusual symptoms in one eye, which she describes as feeling like it is starving. Additionally, she mentions frequent urination, which she finds bothersome. The urinary frequency started approximately 3 weeks ago. She has been sexually active recently and uses an intrauterine device (IUD) for contraception. She has been using the Mirena IUD for 8 years and has noticed bleeding since resuming sexual activity a month ago. The bleeding is not heavy but is noticeable on her underwear. She occasionally experiences lower abdominal pain. She has been experiencing intermittent breast tenderness, with the most recent episode occurring last week.    She expresses concern about potential pregnancy due to a faint line on a home pregnancy test, even though she does not menstruate due to her IUD. She has an appointment scheduled with Dr. Ruiz on 10/17/2025 at 9:30 AM.    She experiences fatigue, often sleeping throughout the day and during work hours. She had a bout of diarrhea the day before yesterday and reports dizziness. She has not eaten or drunk anything today. She has been prescribed vitamin D supplements but has not been taking them due to their large size, which causes difficulty swallowing.    Sleep: She reports sleeping throughout the day and during work hours.  Sexual Practices: She has been sexually active recently.       Review of Systems:   Review of Systems   Genitourinary:         See HPI       I have reviewed the patients family history, social  "history, past medical history, past surgical history and have updated it as appropriate.     Medications:     Current Outpatient Medications:     Blood Glucose Monitoring Suppl (ONE TOUCH ULTRA 2) w/Device kit, See Admin Instructions., Disp: , Rfl:     Cholecalciferol (Vitamin D3) 1.25 MG (88416 UT) capsule, Take 1 capsule by mouth Every 7 (Seven) Days., Disp: 13 capsule, Rfl: 1    clobetasol (TEMOVATE) 0.05 % ointment, Apply 1 application  topically to the appropriate area as directed 2 (Two) Times a Day. Blitz:  1 application topically to affected area BID (AM & HS) x 2 wks; then 1x/day (HS) x 2 wks; then 2x/ week., Disp: 45 g, Rfl: 3    hydrOXYzine (ATARAX) 25 MG tablet, Take 1 tablet by mouth Every 12 (Twelve) Hours., Disp: , Rfl:     Lancets (OneTouch Delica Plus [id number]) misc, 1 Lancet by Other route Daily. Check BG daily, Disp: 100 each, Rfl: 1    levonorgestrel (MIRENA) 20 MCG/24HR IUD, 1 each by Intrauterine route 1 (One) Time., Disp: , Rfl:     OneTouch Ultra test strip, USE STRIP TO CHECK GLUCOSE ONCE DAILY, Disp: , Rfl:     Tirzepatide 2.5 MG/0.5ML solution auto-injector, Inject 2.5 mg under the skin into the appropriate area as directed 1 (One) Time Per Week., Disp: 2 mL, Rfl: 0    Allergies:   Allergies   Allergen Reactions    Metformin Diarrhea       PHQ-9 Total Score:      Physical Exam:  Vital Signs:   Vitals:    07/16/25 1032   BP: 108/76   Pulse: 84   Resp: 14   Temp: 98 °F (36.7 °C)   SpO2: 99%   Weight: 91.1 kg (200 lb 12.8 oz)   Height: 167.6 cm (66\")     Body mass index is 32.41 kg/m².   BMI is >= 30 and <35. (Class 1 Obesity). The following options were offered after discussion;: weight loss educational material (shared in after visit summary)       Physical Exam  Vitals and nursing note reviewed.   Constitutional:       Appearance: Normal appearance.   HENT:      Head: Normocephalic and atraumatic.   Cardiovascular:      Rate and Rhythm: Normal rate and regular rhythm.   Pulmonary:      " Effort: Pulmonary effort is normal.      Breath sounds: Normal breath sounds.   Skin:     General: Skin is warm.   Neurological:      Mental Status: She is alert and oriented to person, place, and time.           Assessment/Plan:   Diagnoses and all orders for this visit:    1. Urinary frequency (Primary)  -     POCT urinalysis dipstick, automated  -     POCT pregnancy, urine  -     Urine Culture - Urine, Urine, Clean Catch    2. Type 2 diabetes mellitus with hyperglycemia, without long-term current use of insulin  -     POC Albumin/Creatinine Ratio Urine  -     Tirzepatide 2.5 MG/0.5ML solution auto-injector; Inject 2.5 mg under the skin into the appropriate area as directed 1 (One) Time Per Week.  Dispense: 2 mL; Refill: 0    3. Abnormal uterine bleeding  -     Comprehensive Metabolic Panel  -     Iron Profile w/o Ferritin  -     Ferritin  -     HCG, B-subunit, Quantitative  -     CBC & Differential    4. Hematuria, unspecified type  -     Urine Culture - Urine, Urine, Clean Catch       Assessment & Plan  1. Increased urination.  - Increased urination may be related to diabetes and current medication regimen.  - Physical exam findings indicate the need for a change in medication.  - Discussion included switching from Ozempic to Mounjaro to help manage symptoms and assist with weight loss.  - Prescription for Mounjaro will be sent to pharmacy, pending prior authorization. Continue Ozempic until Mounjaro is approved.    2. Fatigue.  - Fatigue could be related to low vitamin D levels.  - Vitamin D levels are low based on recent lab results.  - Advised to start taking vitamin D 5000 units daily, which can be purchased from pharmacy or [facility].  - Counseling provided on different brands and sizes of vitamin D pills.    3. Bleeding.  - Reported bleeding started about a month ago, coinciding with resuming sexual activity.  - Physical exam findings include mild blood in urine specimen.  - Blood test will be conducted to  check hCG levels and recheck iron levels.  - Urine culture will be sent for analysis.    4. Anxiety.  - Symptoms include dizziness and racing heart, suggestive of anxiety.  - Advised to eat and drink something to help alleviate symptoms.  - Counseling provided on managing anxiety and reassurance given.    Patient or patient representative verbalized consent for the use of Ambient Listening during the visit with  CELESTE Ruth for chart documentation. 7/16/2025  14:06 EDT      Follow Up:   Return in about 3 months (around 10/16/2025) for Recheck.      Paula Barry. CELESTE   [facility]

## 2025-07-17 LAB
ALBUMIN SERPL-MCNC: 4.3 G/DL (ref 3.9–4.9)
ALP SERPL-CCNC: 59 IU/L (ref 44–121)
ALT SERPL-CCNC: 14 IU/L (ref 0–32)
AST SERPL-CCNC: 18 IU/L (ref 0–40)
BASOPHILS # BLD AUTO: 0 X10E3/UL (ref 0–0.2)
BASOPHILS NFR BLD AUTO: 0 %
BILIRUB SERPL-MCNC: 0.3 MG/DL (ref 0–1.2)
BUN SERPL-MCNC: 14 MG/DL (ref 6–20)
BUN/CREAT SERPL: 18 (ref 9–23)
CALCIUM SERPL-MCNC: 10.2 MG/DL (ref 8.7–10.2)
CHLORIDE SERPL-SCNC: 104 MMOL/L (ref 96–106)
CO2 SERPL-SCNC: 17 MMOL/L (ref 20–29)
CREAT SERPL-MCNC: 0.77 MG/DL (ref 0.57–1)
EGFRCR SERPLBLD CKD-EPI 2021: 101 ML/MIN/1.73
EOSINOPHIL # BLD AUTO: 0.1 X10E3/UL (ref 0–0.4)
EOSINOPHIL NFR BLD AUTO: 2 %
ERYTHROCYTE [DISTWIDTH] IN BLOOD BY AUTOMATED COUNT: 12.7 % (ref 11.7–15.4)
FERRITIN SERPL-MCNC: 146 NG/ML (ref 15–150)
GLOBULIN SER CALC-MCNC: 3.3 G/DL (ref 1.5–4.5)
GLUCOSE SERPL-MCNC: 82 MG/DL (ref 70–99)
HCG INTACT+B SERPL-ACNC: <1 MIU/ML
HCT VFR BLD AUTO: 42.9 % (ref 34–46.6)
HGB BLD-MCNC: 13.7 G/DL (ref 11.1–15.9)
IMM GRANULOCYTES # BLD AUTO: 0 X10E3/UL (ref 0–0.1)
IMM GRANULOCYTES NFR BLD AUTO: 0 %
IRON SATN MFR SERPL: 25 % (ref 15–55)
IRON SERPL-MCNC: 90 UG/DL (ref 27–159)
LYMPHOCYTES # BLD AUTO: 1.5 X10E3/UL (ref 0.7–3.1)
LYMPHOCYTES NFR BLD AUTO: 32 %
MCH RBC QN AUTO: 29.1 PG (ref 26.6–33)
MCHC RBC AUTO-ENTMCNC: 31.9 G/DL (ref 31.5–35.7)
MCV RBC AUTO: 91 FL (ref 79–97)
MONOCYTES # BLD AUTO: 0.4 X10E3/UL (ref 0.1–0.9)
MONOCYTES NFR BLD AUTO: 9 %
NEUTROPHILS # BLD AUTO: 2.7 X10E3/UL (ref 1.4–7)
NEUTROPHILS NFR BLD AUTO: 57 %
PLATELET # BLD AUTO: 324 X10E3/UL (ref 150–450)
POTASSIUM SERPL-SCNC: 4.3 MMOL/L (ref 3.5–5.2)
PROT SERPL-MCNC: 7.6 G/DL (ref 6–8.5)
RBC # BLD AUTO: 4.71 X10E6/UL (ref 3.77–5.28)
SODIUM SERPL-SCNC: 137 MMOL/L (ref 134–144)
TIBC SERPL-MCNC: 361 UG/DL (ref 250–450)
UIBC SERPL-MCNC: 271 UG/DL (ref 131–425)
WBC # BLD AUTO: 4.7 X10E3/UL (ref 3.4–10.8)

## 2025-07-22 ENCOUNTER — TELEPHONE (OUTPATIENT)
Dept: FAMILY MEDICINE CLINIC | Facility: CLINIC | Age: 40
End: 2025-07-22
Payer: COMMERCIAL

## 2025-07-22 DIAGNOSIS — E11.65 TYPE 2 DIABETES MELLITUS WITH HYPERGLYCEMIA, WITHOUT LONG-TERM CURRENT USE OF INSULIN: Primary | ICD-10-CM

## 2025-07-22 NOTE — TELEPHONE ENCOUNTER
PT WOULD LIKE TO KNOW WHAT PCP RECOMMENDS FOR NEXT STEPS:   WHICH OF TRULICITY OR LIRAFLUTIDE DOES SHE RECOMMEND?  OR DOES SHE RECOMMEND UPPING OZEMPIC?     PT ONLY HAS ONE MORE SHOT FOR THIS WEEK AND DIDN'T WANT TO REFILL IF SHE WILL BE SWITCHING.     BEST CALLBACK [phone]

## 2025-07-28 NOTE — TELEPHONE ENCOUNTER
"    Caller: Kay Smith \"AWE-PRAKASH\"    Relationship to patient: Self    Best call back number: [phone]     Patient is needing: UPDATE AND PRESCRIPTION CALLED IN TO     [facility] [address] [place] [address] [phone] [facility] [phone] FX     "

## 2025-07-29 RX ORDER — DULAGLUTIDE 0.75 MG/.5ML
0.75 INJECTION, SOLUTION SUBCUTANEOUS WEEKLY
Qty: 2 ML | Refills: 0 | Status: SHIPPED | OUTPATIENT
Start: 2025-07-29

## 2025-07-29 NOTE — TELEPHONE ENCOUNTER
Spoke with patient, will plan to send in Trulicity .75 mg. Patient will follow up in 4 weeks with Paula to discuss medication (and concerns of weight gain). Advised patient to contact office prior to that if she experiences any adverse effects or has questions/concerns.

## 2025-08-06 DIAGNOSIS — E11.65 TYPE 2 DIABETES MELLITUS WITH HYPERGLYCEMIA, WITHOUT LONG-TERM CURRENT USE OF INSULIN: ICD-10-CM

## 2025-08-07 RX ORDER — SEMAGLUTIDE 1.34 MG/ML
1 INJECTION, SOLUTION SUBCUTANEOUS WEEKLY
Qty: 3 ML | Refills: 0 | Status: SHIPPED | OUTPATIENT
Start: 2025-08-07

## 2025-08-17 DIAGNOSIS — M46.1 SI (SACROILIAC) JOINT INFLAMMATION: ICD-10-CM

## 2025-08-17 DIAGNOSIS — M54.32 LEFT SCIATIC NERVE PAIN: ICD-10-CM

## 2025-08-18 RX ORDER — METHOCARBAMOL 750 MG/1
750 TABLET, FILM COATED ORAL 3 TIMES DAILY
Qty: 21 TABLET | Refills: 0 | OUTPATIENT
Start: 2025-08-18 | End: 2025-08-25

## 2025-08-18 RX ORDER — DICLOFENAC SODIUM 75 MG/1
75 TABLET, DELAYED RELEASE ORAL 2 TIMES DAILY
Qty: 60 TABLET | Refills: 0 | OUTPATIENT
Start: 2025-08-18

## 2025-08-26 ENCOUNTER — OFFICE VISIT (OUTPATIENT)
Dept: FAMILY MEDICINE CLINIC | Facility: CLINIC | Age: 40
End: 2025-08-26
Payer: COMMERCIAL

## 2025-08-26 VITALS
WEIGHT: 204.2 LBS | TEMPERATURE: 98 F | DIASTOLIC BLOOD PRESSURE: 64 MMHG | SYSTOLIC BLOOD PRESSURE: 110 MMHG | RESPIRATION RATE: 16 BRPM | HEART RATE: 90 BPM | OXYGEN SATURATION: 96 % | BODY MASS INDEX: 32.82 KG/M2 | HEIGHT: 66 IN

## 2025-08-26 DIAGNOSIS — N60.01 BREAST CYST, RIGHT: ICD-10-CM

## 2025-08-26 DIAGNOSIS — M46.1 SI (SACROILIAC) JOINT INFLAMMATION: ICD-10-CM

## 2025-08-26 DIAGNOSIS — E11.65 TYPE 2 DIABETES MELLITUS WITH HYPERGLYCEMIA, WITHOUT LONG-TERM CURRENT USE OF INSULIN: Primary | ICD-10-CM

## 2025-08-26 DIAGNOSIS — B00.1 COLD SORE: ICD-10-CM

## 2025-08-26 DIAGNOSIS — R92.30 DENSE BREAST: ICD-10-CM

## 2025-08-26 DIAGNOSIS — M54.32 LEFT SCIATIC NERVE PAIN: ICD-10-CM

## 2025-08-26 PROCEDURE — 99214 OFFICE O/P EST MOD 30 MIN: CPT | Performed by: NURSE PRACTITIONER

## 2025-08-26 RX ORDER — VALACYCLOVIR HYDROCHLORIDE 1 G/1
1000 TABLET, FILM COATED ORAL 2 TIMES DAILY
Qty: 14 TABLET | Refills: 3 | Status: SHIPPED | OUTPATIENT
Start: 2025-08-26 | End: 2025-09-02

## 2025-08-26 RX ORDER — METHOCARBAMOL 750 MG/1
750 TABLET, FILM COATED ORAL 3 TIMES DAILY
Qty: 90 TABLET | Refills: 1 | Status: SHIPPED | OUTPATIENT
Start: 2025-08-26

## 2025-08-26 RX ORDER — DULAGLUTIDE 0.75 MG/.5ML
0.75 INJECTION, SOLUTION SUBCUTANEOUS WEEKLY
Qty: 2 ML | Refills: 0 | Status: SHIPPED | OUTPATIENT
Start: 2025-08-26

## 2025-08-26 RX ORDER — DICLOFENAC SODIUM 75 MG/1
75 TABLET, DELAYED RELEASE ORAL 2 TIMES DAILY
Qty: 180 TABLET | Refills: 1 | Status: SHIPPED | OUTPATIENT
Start: 2025-08-26